# Patient Record
Sex: FEMALE | Race: WHITE | NOT HISPANIC OR LATINO | URBAN - METROPOLITAN AREA
[De-identification: names, ages, dates, MRNs, and addresses within clinical notes are randomized per-mention and may not be internally consistent; named-entity substitution may affect disease eponyms.]

---

## 2018-06-08 ENCOUNTER — EMERGENCY (EMERGENCY)
Facility: HOSPITAL | Age: 53
LOS: 1 days | Discharge: HOME | End: 2018-06-08
Admitting: EMERGENCY MEDICINE
Payer: COMMERCIAL

## 2018-06-08 VITALS
OXYGEN SATURATION: 100 % | SYSTOLIC BLOOD PRESSURE: 155 MMHG | DIASTOLIC BLOOD PRESSURE: 99 MMHG | TEMPERATURE: 98 F | RESPIRATION RATE: 20 BRPM | HEART RATE: 94 BPM

## 2018-06-08 DIAGNOSIS — Z98.89 OTHER SPECIFIED POSTPROCEDURAL STATES: Chronic | ICD-10-CM

## 2018-06-08 DIAGNOSIS — Z98.890 OTHER SPECIFIED POSTPROCEDURAL STATES: Chronic | ICD-10-CM

## 2018-06-08 PROCEDURE — 99283 EMERGENCY DEPT VISIT LOW MDM: CPT

## 2018-06-08 RX ORDER — PREDNISOLONE 5 MG
3 TABLET ORAL
Qty: 9 | Refills: 0 | OUTPATIENT
Start: 2018-06-08 | End: 2018-06-12

## 2018-06-08 RX ADMIN — Medication 40 MILLIGRAM(S): at 23:28

## 2018-06-08 NOTE — ED ADULT TRIAGE NOTE - CHIEF COMPLAINT QUOTE
C/o asthma exacerbation went to PCP this am told has URI, told the doctor she had prednisone at home but did not and by then office was closed. Respirations noted to be even and unlabored in triage, appears comfortable

## 2018-06-08 NOTE — ED PROVIDER NOTE - PROGRESS NOTE DETAILS
WOODY Soria: pt seen with WOODY Rawls-- 39 yo F here for URI sxs seen by pmd today and dx with viral syndrome, reports few days of cough congestion wheezing, hx of asthma and using albuterol prn with relief. denies fever chills cp sob. pmd told her to start prednisone taper today however patient thought she had at home but did not, reports typically does a taper from pred 40 mg. will dc with pred 40/30/20/10 x 4 days for taper. pt agrees with plan. lungs CTABL vss,.

## 2018-06-08 NOTE — ED PROVIDER NOTE - OBJECTIVE STATEMENT
41 yo F here for URI sxs seen by pmd today and dx with viral syndrome, reports few days of cough congestion wheezing, hx of asthma and using albuterol prn with relief. Pt was told by pmd to start prednisone taper however she does not have prescription at home. Has been using her nebulizer and albuterol pump. Denies sob, chest pain, wheezing. Speaks full sentences. No hospitalizations or intubations.

## 2018-06-08 NOTE — ED PROVIDER NOTE - CARE PLAN
Principal Discharge DX:	Asthma in adult without complication, unspecified asthma severity, unspecified whether persistent

## 2018-06-09 ENCOUNTER — EMERGENCY (EMERGENCY)
Facility: HOSPITAL | Age: 53
LOS: 1 days | Discharge: HOME | End: 2018-06-09
Attending: EMERGENCY MEDICINE | Admitting: EMERGENCY MEDICINE
Payer: COMMERCIAL

## 2018-06-09 VITALS
RESPIRATION RATE: 20 BRPM | DIASTOLIC BLOOD PRESSURE: 82 MMHG | TEMPERATURE: 98 F | OXYGEN SATURATION: 100 % | HEART RATE: 84 BPM | SYSTOLIC BLOOD PRESSURE: 142 MMHG

## 2018-06-09 DIAGNOSIS — Z98.890 OTHER SPECIFIED POSTPROCEDURAL STATES: Chronic | ICD-10-CM

## 2018-06-09 DIAGNOSIS — Z98.89 OTHER SPECIFIED POSTPROCEDURAL STATES: Chronic | ICD-10-CM

## 2018-06-09 PROCEDURE — 99285 EMERGENCY DEPT VISIT HI MDM: CPT | Mod: 25

## 2018-06-09 PROCEDURE — 93010 ELECTROCARDIOGRAM REPORT: CPT | Mod: NC

## 2018-06-09 NOTE — ED PROVIDER NOTE - MEDICAL DECISION MAKING DETAILS
Gypsy BROWN: 52 y/o female with hx of HTN, HLD, Asthma (never intubated or admitted for Asthma) here with SOB and chest discomfort. Patient reports 3 days of dyspnea associated with congestion in her back and also L sided chest discomfort. Also endorses sore throat. Patient saw her PMD who tested her for strep throat. Patient also was seen at [facility] last night and was prescribed Prednisone. Patient endorses worse dyspnea with exertion. Reports dry cough. Denies fever, chills, CP, palpitations, lightheaededness, syncope, neck pain, travel, sick contacts. or LE edema. Exam shows a female in NAD with clear lungs and abd soft and nontender and cardiac S1S2 noted. No rash and no LE edema. Consider Asthma, RAD, Bronchitis. Also given PMH consider ACS. Plan CBC, CMP, CXR, EKG, Coags, Cardiacs, Reassess.

## 2018-06-09 NOTE — ED PROVIDER NOTE - OBJECTIVE STATEMENT
Gypsy BROWN: 52 y/o female with hx of HTN, HLD, Asthma (never intubated or admitted for Asthma) here with SOB and chest discomfort. Patient reports 3 days of dyspnea associated with congestion in her back and also L sided chest discomfort. Also endorses sore throat. Patient saw her PMD who tested her for strep throat. Patient also was seen at [facility] last night and was prescribed Prednisone. Patient endorses worse dyspnea with exertion. Reports dry cough. Denies fever, chills, CP, palpitations, lightheaededness, syncope, neck pain, travel, sick contacts. or LE edema. Exam shows a female in NAD with clear lungs and abd soft and nontender and cardiac S1S2 noted. No rash and no LE edema. Consider Asthma, RAD, Bronchitis. Also given PMH consider ACS. Plan CBC, CMP, CXR, EKG, Coags, Cardiacs, Reassess.

## 2018-06-09 NOTE — ED PROVIDER NOTE - PROGRESS NOTE DETAILS
Gypsy BROWN: Patient reassessed and reports feeling better. Patient results discussed and she will follow up with her PMD

## 2018-06-09 NOTE — ED ADULT NURSE NOTE - OBJECTIVE STATEMENT
54 yo female bought by EMS complaining of "burning on my left lung when I take a deep breath" as per patient she is being in and out of hospitals and they cannot find what she has. Pt states being anxious, unable to sleep,  at the bedside. Pt alerted and oriented x3, no signs of acute distress noted at this moment, vitals WNL, lungs clear, heart sounds normal. Pt pending on MD evaluation, will continue to monitor closely.

## 2018-06-10 VITALS
DIASTOLIC BLOOD PRESSURE: 65 MMHG | OXYGEN SATURATION: 99 % | SYSTOLIC BLOOD PRESSURE: 108 MMHG | HEART RATE: 83 BPM | RESPIRATION RATE: 20 BRPM | TEMPERATURE: 98 F

## 2018-06-10 LAB
ALBUMIN SERPL ELPH-MCNC: 4.5 G/DL — SIGNIFICANT CHANGE UP (ref 3.3–5)
ALP SERPL-CCNC: 100 U/L — SIGNIFICANT CHANGE UP (ref 40–120)
ALT FLD-CCNC: 29 U/L — SIGNIFICANT CHANGE UP (ref 10–45)
ANION GAP SERPL CALC-SCNC: 16 MMOL/L — SIGNIFICANT CHANGE UP (ref 5–17)
APTT BLD: 27.5 SEC — SIGNIFICANT CHANGE UP (ref 27.5–37.4)
AST SERPL-CCNC: 21 U/L — SIGNIFICANT CHANGE UP (ref 10–40)
BASOPHILS # BLD AUTO: 0 K/UL — SIGNIFICANT CHANGE UP (ref 0–0.2)
BASOPHILS NFR BLD AUTO: 0.2 % — SIGNIFICANT CHANGE UP (ref 0–2)
BILIRUB SERPL-MCNC: 0.3 MG/DL — SIGNIFICANT CHANGE UP (ref 0.2–1.2)
BUN SERPL-MCNC: 10 MG/DL — SIGNIFICANT CHANGE UP (ref 7–23)
CALCIUM SERPL-MCNC: 9.6 MG/DL — SIGNIFICANT CHANGE UP (ref 8.4–10.5)
CHLORIDE SERPL-SCNC: 98 MMOL/L — SIGNIFICANT CHANGE UP (ref 96–108)
CK MB BLD-MCNC: 2.3 % — SIGNIFICANT CHANGE UP (ref 0–3.5)
CK MB CFR SERPL CALC: 3.9 NG/ML — HIGH (ref 0–3.8)
CK SERPL-CCNC: 169 U/L — SIGNIFICANT CHANGE UP (ref 25–170)
CO2 SERPL-SCNC: 21 MMOL/L — LOW (ref 22–31)
CREAT SERPL-MCNC: 0.61 MG/DL — SIGNIFICANT CHANGE UP (ref 0.5–1.3)
EOSINOPHIL # BLD AUTO: 0 K/UL — SIGNIFICANT CHANGE UP (ref 0–0.5)
EOSINOPHIL NFR BLD AUTO: 0.2 % — SIGNIFICANT CHANGE UP (ref 0–6)
GLUCOSE SERPL-MCNC: 143 MG/DL — HIGH (ref 70–99)
HCT VFR BLD CALC: 41.9 % — SIGNIFICANT CHANGE UP (ref 34.5–45)
HGB BLD-MCNC: 13.8 G/DL — SIGNIFICANT CHANGE UP (ref 11.5–15.5)
INR BLD: 1.07 RATIO — SIGNIFICANT CHANGE UP (ref 0.88–1.16)
LYMPHOCYTES # BLD AUTO: 0.7 K/UL — LOW (ref 1–3.3)
LYMPHOCYTES # BLD AUTO: 7.8 % — LOW (ref 13–44)
MCHC RBC-ENTMCNC: 27.5 PG — SIGNIFICANT CHANGE UP (ref 27–34)
MCHC RBC-ENTMCNC: 32.8 GM/DL — SIGNIFICANT CHANGE UP (ref 32–36)
MCV RBC AUTO: 83.8 FL — SIGNIFICANT CHANGE UP (ref 80–100)
MONOCYTES # BLD AUTO: 0.6 K/UL — SIGNIFICANT CHANGE UP (ref 0–0.9)
MONOCYTES NFR BLD AUTO: 6.9 % — SIGNIFICANT CHANGE UP (ref 2–14)
NEUTROPHILS # BLD AUTO: 7.6 K/UL — HIGH (ref 1.8–7.4)
NEUTROPHILS NFR BLD AUTO: 84.9 % — HIGH (ref 43–77)
PLATELET # BLD AUTO: 323 K/UL — SIGNIFICANT CHANGE UP (ref 150–400)
POTASSIUM SERPL-MCNC: 4.3 MMOL/L — SIGNIFICANT CHANGE UP (ref 3.5–5.3)
POTASSIUM SERPL-SCNC: 4.3 MMOL/L — SIGNIFICANT CHANGE UP (ref 3.5–5.3)
PROT SERPL-MCNC: 7.9 G/DL — SIGNIFICANT CHANGE UP (ref 6–8.3)
PROTHROM AB SERPL-ACNC: 11.6 SEC — SIGNIFICANT CHANGE UP (ref 9.8–12.7)
RBC # BLD: 5 M/UL — SIGNIFICANT CHANGE UP (ref 3.8–5.2)
RBC # FLD: 11.5 % — SIGNIFICANT CHANGE UP (ref 10.3–14.5)
SODIUM SERPL-SCNC: 135 MMOL/L — SIGNIFICANT CHANGE UP (ref 135–145)
TROPONIN T SERPL-MCNC: <0.01 NG/ML — SIGNIFICANT CHANGE UP (ref 0–0.06)
TROPONIN T SERPL-MCNC: <0.01 NG/ML — SIGNIFICANT CHANGE UP (ref 0–0.06)
WBC # BLD: 9 K/UL — SIGNIFICANT CHANGE UP (ref 3.8–10.5)
WBC # FLD AUTO: 9 K/UL — SIGNIFICANT CHANGE UP (ref 3.8–10.5)

## 2018-06-10 PROCEDURE — 99283 EMERGENCY DEPT VISIT LOW MDM: CPT | Mod: 25

## 2018-06-10 PROCEDURE — 82550 ASSAY OF CK (CPK): CPT

## 2018-06-10 PROCEDURE — 84484 ASSAY OF TROPONIN QUANT: CPT

## 2018-06-10 PROCEDURE — 85610 PROTHROMBIN TIME: CPT

## 2018-06-10 PROCEDURE — 85730 THROMBOPLASTIN TIME PARTIAL: CPT

## 2018-06-10 PROCEDURE — 80053 COMPREHEN METABOLIC PANEL: CPT

## 2018-06-10 PROCEDURE — 93005 ELECTROCARDIOGRAM TRACING: CPT

## 2018-06-10 PROCEDURE — 71046 X-RAY EXAM CHEST 2 VIEWS: CPT | Mod: 26

## 2018-06-10 PROCEDURE — 71046 X-RAY EXAM CHEST 2 VIEWS: CPT

## 2018-06-10 PROCEDURE — 82553 CREATINE MB FRACTION: CPT

## 2018-06-10 PROCEDURE — 85027 COMPLETE CBC AUTOMATED: CPT

## 2018-06-10 RX ORDER — ACETAMINOPHEN 500 MG
650 TABLET ORAL ONCE
Qty: 0 | Refills: 0 | Status: COMPLETED | OUTPATIENT
Start: 2018-06-10 | End: 2018-06-10

## 2018-06-10 RX ADMIN — Medication 650 MILLIGRAM(S): at 00:53

## 2018-06-13 ENCOUNTER — EMERGENCY (EMERGENCY)
Facility: HOSPITAL | Age: 53
LOS: 1 days | Discharge: HOME | End: 2018-06-13
Attending: EMERGENCY MEDICINE
Payer: COMMERCIAL

## 2018-06-13 VITALS
DIASTOLIC BLOOD PRESSURE: 98 MMHG | TEMPERATURE: 98 F | WEIGHT: 130.07 LBS | HEIGHT: 60 IN | SYSTOLIC BLOOD PRESSURE: 173 MMHG | OXYGEN SATURATION: 98 % | RESPIRATION RATE: 20 BRPM | HEART RATE: 88 BPM

## 2018-06-13 VITALS
SYSTOLIC BLOOD PRESSURE: 137 MMHG | HEART RATE: 94 BPM | OXYGEN SATURATION: 96 % | DIASTOLIC BLOOD PRESSURE: 73 MMHG | RESPIRATION RATE: 17 BRPM | TEMPERATURE: 98 F

## 2018-06-13 DIAGNOSIS — Z98.890 OTHER SPECIFIED POSTPROCEDURAL STATES: Chronic | ICD-10-CM

## 2018-06-13 DIAGNOSIS — Z98.89 OTHER SPECIFIED POSTPROCEDURAL STATES: Chronic | ICD-10-CM

## 2018-06-13 PROCEDURE — 99284 EMERGENCY DEPT VISIT MOD MDM: CPT

## 2018-06-13 PROCEDURE — 94640 AIRWAY INHALATION TREATMENT: CPT

## 2018-06-13 PROCEDURE — 99285 EMERGENCY DEPT VISIT HI MDM: CPT | Mod: 25

## 2018-06-13 RX ORDER — MONTELUKAST 4 MG/1
1 TABLET, CHEWABLE ORAL
Qty: 30 | Refills: 0 | OUTPATIENT
Start: 2018-06-13

## 2018-06-13 RX ORDER — IPRATROPIUM/ALBUTEROL SULFATE 18-103MCG
3 AEROSOL WITH ADAPTER (GRAM) INHALATION ONCE
Qty: 0 | Refills: 0 | Status: COMPLETED | OUTPATIENT
Start: 2018-06-13 | End: 2018-06-13

## 2018-06-13 RX ORDER — MONTELUKAST 4 MG/1
10 TABLET, CHEWABLE ORAL ONCE
Qty: 0 | Refills: 0 | Status: COMPLETED | OUTPATIENT
Start: 2018-06-13 | End: 2018-06-13

## 2018-06-13 RX ADMIN — Medication 3 MILLILITER(S): at 10:50

## 2018-06-13 RX ADMIN — Medication 40 MILLIGRAM(S): at 12:22

## 2018-06-13 RX ADMIN — Medication 3 MILLILITER(S): at 10:36

## 2018-06-13 RX ADMIN — MONTELUKAST 10 MILLIGRAM(S): 4 TABLET, CHEWABLE ORAL at 12:22

## 2018-06-13 RX ADMIN — Medication 3 MILLILITER(S): at 10:19

## 2018-06-13 NOTE — ED PROVIDER NOTE - ATTENDING CONTRIBUTION TO CARE
ATTENDING MD: I, Wilfredo Manuel, have personally seen and examined this patient.  I have discussed all aspects of care with the fellow. Fellow note reviewed and agree on plan of care and except where noted.  See HPI, PE, and MDM for details.    well appearing female in NAD, heart normal rate, regular rhythm, mucus membranes are moist, oropharynx is mildly erythemetous with cobblestoning, no exudate or edema, lungs with prolonged expiratory phase and expiratory wheezing. repeat exam after nebs is clear and without focal aventitious breath sounds. respirations before treatment with, no accessory muscle use or respriatory distress, normal rate, regular rhythm, no murmurs, rubs, or gallops, abd soft, notnenger, skin warma nd dry, no extremity swelling, asymmetry or tenderness, no JVD.    MDM: asthma exacerbation without major distress but persistent symptoms despite a treatmetn with low dose steroids. will give nebs, and higher dose/longer steroid burst, plan to f/u with PCP and pulmonology as outpatient. has already had negative XR and workup. I have advised the patient on the usual course of this condition, home care, an appropriate schedule for follow-up, and concerning signs and symptoms that should prompt return to the emergency department. I answered all questions to the best of my ability. The patient is stable for discharge.

## 2018-06-13 NOTE — ED PROVIDER NOTE - OBJECTIVE STATEMENT
52 yo f with history of HTN, HLD, asthma presents with chief complaint of shortness of breath for 4 days. Patient reports that she has had wheezing, coughing and nasal congestion. Seen previously at [facility] and [facility], given duonebs and prednisone with mild resolution of symptoms. She still had persistent symptoms and told her PMD who then put her a 5 day course of azithromycin. She is having persistent symptoms and so came to the ED for evaluation. 52 yo f with history of HTN, HLD, asthma presents with chief complaint of shortness of breath for 4 days. Patient reports that she has had wheezing, coughing and nasal congestion. Seen previously at [facility] and [facility], given duonebs and low dose prednisone with mild resolution of symptoms. She still had persistent symptoms and told her PMD who then put her a 5 day course of azithromycin. She is having persistent symptoms and so came to the ED for evaluation.

## 2018-06-13 NOTE — ED PROVIDER NOTE - PROGRESS NOTE DETAILS
feels improved, no major work of breathing. will plan DC with close f/u on prolonged hgiher dose steroid treatment

## 2018-06-13 NOTE — ED ADULT NURSE NOTE - OBJECTIVE STATEMENT
54 yo female with PMH asthma, HTN, osteoporosis, fibromyalgia presents to ED stating "I am having an asthma attack." Patient reports that she has been experiencing wheezes/cough/SOB x1 week, she was seen here in ED three days ago and DC home, patient followed up with her PMD who prescribed her azithromycin, patient on 3rd day of antibiotic treatment and notes that symptoms have gotten worse. She reports difficulty sleeping 2/2 cough, chest pain with coughing, and productive cough. She denies fever

## 2018-06-15 ENCOUNTER — INPATIENT (INPATIENT)
Facility: HOSPITAL | Age: 53
LOS: 4 days | Discharge: HOME | DRG: 202 | End: 2018-06-20
Attending: INTERNAL MEDICINE | Admitting: INTERNAL MEDICINE
Payer: COMMERCIAL

## 2018-06-15 VITALS
TEMPERATURE: 98 F | SYSTOLIC BLOOD PRESSURE: 148 MMHG | HEIGHT: 60 IN | WEIGHT: 130.07 LBS | OXYGEN SATURATION: 98 % | HEART RATE: 84 BPM | RESPIRATION RATE: 19 BRPM | DIASTOLIC BLOOD PRESSURE: 87 MMHG

## 2018-06-15 DIAGNOSIS — Z98.890 OTHER SPECIFIED POSTPROCEDURAL STATES: Chronic | ICD-10-CM

## 2018-06-15 DIAGNOSIS — Z98.89 OTHER SPECIFIED POSTPROCEDURAL STATES: Chronic | ICD-10-CM

## 2018-06-15 DIAGNOSIS — J45.901 UNSPECIFIED ASTHMA WITH (ACUTE) EXACERBATION: ICD-10-CM

## 2018-06-15 LAB
ALBUMIN SERPL ELPH-MCNC: 4.4 G/DL — SIGNIFICANT CHANGE UP (ref 3.3–5)
ALP SERPL-CCNC: 85 U/L — SIGNIFICANT CHANGE UP (ref 40–120)
ALT FLD-CCNC: 20 U/L — SIGNIFICANT CHANGE UP (ref 10–45)
ANION GAP SERPL CALC-SCNC: 17 MMOL/L — SIGNIFICANT CHANGE UP (ref 5–17)
AST SERPL-CCNC: 19 U/L — SIGNIFICANT CHANGE UP (ref 10–40)
BASOPHILS # BLD AUTO: 0 K/UL — SIGNIFICANT CHANGE UP (ref 0–0.2)
BASOPHILS NFR BLD AUTO: 0.4 % — SIGNIFICANT CHANGE UP (ref 0–2)
BILIRUB SERPL-MCNC: 0.3 MG/DL — SIGNIFICANT CHANGE UP (ref 0.2–1.2)
BUN SERPL-MCNC: 8 MG/DL — SIGNIFICANT CHANGE UP (ref 7–23)
CALCIUM SERPL-MCNC: 9.3 MG/DL — SIGNIFICANT CHANGE UP (ref 8.4–10.5)
CHLORIDE SERPL-SCNC: 96 MMOL/L — SIGNIFICANT CHANGE UP (ref 96–108)
CO2 SERPL-SCNC: 21 MMOL/L — LOW (ref 22–31)
CREAT SERPL-MCNC: 0.65 MG/DL — SIGNIFICANT CHANGE UP (ref 0.5–1.3)
EOSINOPHIL # BLD AUTO: 0 K/UL — SIGNIFICANT CHANGE UP (ref 0–0.5)
EOSINOPHIL NFR BLD AUTO: 0.2 % — SIGNIFICANT CHANGE UP (ref 0–6)
GAS PNL BLDV: SIGNIFICANT CHANGE UP
GLUCOSE SERPL-MCNC: 137 MG/DL — HIGH (ref 70–99)
HCT VFR BLD CALC: 43 % — SIGNIFICANT CHANGE UP (ref 34.5–45)
HGB BLD-MCNC: 14.3 G/DL — SIGNIFICANT CHANGE UP (ref 11.5–15.5)
HPIV3 RNA SPEC QL NAA+PROBE: DETECTED
LYMPHOCYTES # BLD AUTO: 1.4 K/UL — SIGNIFICANT CHANGE UP (ref 1–3.3)
LYMPHOCYTES # BLD AUTO: 11.3 % — LOW (ref 13–44)
MCHC RBC-ENTMCNC: 27.9 PG — SIGNIFICANT CHANGE UP (ref 27–34)
MCHC RBC-ENTMCNC: 33.1 GM/DL — SIGNIFICANT CHANGE UP (ref 32–36)
MCV RBC AUTO: 84.3 FL — SIGNIFICANT CHANGE UP (ref 80–100)
MONOCYTES # BLD AUTO: 0.4 K/UL — SIGNIFICANT CHANGE UP (ref 0–0.9)
MONOCYTES NFR BLD AUTO: 3.2 % — SIGNIFICANT CHANGE UP (ref 2–14)
NEUTROPHILS # BLD AUTO: 10.3 K/UL — HIGH (ref 1.8–7.4)
NEUTROPHILS NFR BLD AUTO: 85 % — HIGH (ref 43–77)
PLATELET # BLD AUTO: 399 K/UL — SIGNIFICANT CHANGE UP (ref 150–400)
POTASSIUM SERPL-MCNC: 4.3 MMOL/L — SIGNIFICANT CHANGE UP (ref 3.5–5.3)
POTASSIUM SERPL-SCNC: 4.3 MMOL/L — SIGNIFICANT CHANGE UP (ref 3.5–5.3)
PROT SERPL-MCNC: 8.1 G/DL — SIGNIFICANT CHANGE UP (ref 6–8.3)
RAPID RVP RESULT: DETECTED
RBC # BLD: 5.11 M/UL — SIGNIFICANT CHANGE UP (ref 3.8–5.2)
RBC # FLD: 11.7 % — SIGNIFICANT CHANGE UP (ref 10.3–14.5)
SODIUM SERPL-SCNC: 134 MMOL/L — LOW (ref 135–145)
WBC # BLD: 12.1 K/UL — HIGH (ref 3.8–10.5)
WBC # FLD AUTO: 12.1 K/UL — HIGH (ref 3.8–10.5)

## 2018-06-15 PROCEDURE — 71045 X-RAY EXAM CHEST 1 VIEW: CPT | Mod: 26

## 2018-06-15 PROCEDURE — 99285 EMERGENCY DEPT VISIT HI MDM: CPT

## 2018-06-15 RX ORDER — IPRATROPIUM/ALBUTEROL SULFATE 18-103MCG
3 AEROSOL WITH ADAPTER (GRAM) INHALATION EVERY 6 HOURS
Qty: 0 | Refills: 0 | Status: DISCONTINUED | OUTPATIENT
Start: 2018-06-15 | End: 2018-06-18

## 2018-06-15 RX ORDER — MOMETASONE FUROATE 220 UG/1
2 INHALANT RESPIRATORY (INHALATION)
Qty: 0 | Refills: 0 | COMMUNITY

## 2018-06-15 RX ORDER — BUDESONIDE, MICRONIZED 100 %
0.5 POWDER (GRAM) MISCELLANEOUS
Qty: 0 | Refills: 0 | Status: DISCONTINUED | OUTPATIENT
Start: 2018-06-15 | End: 2018-06-20

## 2018-06-15 RX ORDER — CHOLECALCIFEROL (VITAMIN D3) 125 MCG
1 CAPSULE ORAL
Qty: 0 | Refills: 0 | COMMUNITY

## 2018-06-15 RX ORDER — LEVALBUTEROL 1.25 MG/.5ML
0 SOLUTION, CONCENTRATE RESPIRATORY (INHALATION)
Qty: 0 | Refills: 0 | COMMUNITY

## 2018-06-15 RX ORDER — IPRATROPIUM/ALBUTEROL SULFATE 18-103MCG
3 AEROSOL WITH ADAPTER (GRAM) INHALATION EVERY 6 HOURS
Qty: 0 | Refills: 0 | Status: DISCONTINUED | OUTPATIENT
Start: 2018-06-15 | End: 2018-06-15

## 2018-06-15 RX ORDER — ACETAMINOPHEN 500 MG
1 TABLET ORAL
Qty: 0 | Refills: 0 | COMMUNITY

## 2018-06-15 RX ORDER — ACETAMINOPHEN 500 MG
650 TABLET ORAL EVERY 6 HOURS
Qty: 0 | Refills: 0 | Status: DISCONTINUED | OUTPATIENT
Start: 2018-06-15 | End: 2018-06-20

## 2018-06-15 RX ORDER — HYDROCORTISONE 20 MG
40 TABLET ORAL EVERY 8 HOURS
Qty: 0 | Refills: 0 | Status: DISCONTINUED | OUTPATIENT
Start: 2018-06-15 | End: 2018-06-15

## 2018-06-15 RX ORDER — IPRATROPIUM/ALBUTEROL SULFATE 18-103MCG
3 AEROSOL WITH ADAPTER (GRAM) INHALATION ONCE
Qty: 0 | Refills: 0 | Status: COMPLETED | OUTPATIENT
Start: 2018-06-15 | End: 2018-06-15

## 2018-06-15 RX ORDER — MONTELUKAST 4 MG/1
10 TABLET, CHEWABLE ORAL DAILY
Qty: 0 | Refills: 0 | Status: DISCONTINUED | OUTPATIENT
Start: 2018-06-15 | End: 2018-06-20

## 2018-06-15 RX ORDER — VALSARTAN 80 MG/1
1.5 TABLET ORAL
Qty: 0 | Refills: 0 | COMMUNITY

## 2018-06-15 RX ORDER — FLUTICASONE PROPIONATE 50 MCG
1 SPRAY, SUSPENSION NASAL
Qty: 0 | Refills: 0 | COMMUNITY

## 2018-06-15 RX ORDER — MOMETASONE FUROATE 220 UG/1
0 INHALANT RESPIRATORY (INHALATION)
Qty: 0 | Refills: 0 | COMMUNITY

## 2018-06-15 RX ORDER — VALSARTAN 80 MG/1
160 TABLET ORAL DAILY
Qty: 0 | Refills: 0 | Status: DISCONTINUED | OUTPATIENT
Start: 2018-06-15 | End: 2018-06-20

## 2018-06-15 RX ORDER — SODIUM CHLORIDE 9 MG/ML
1000 INJECTION INTRAMUSCULAR; INTRAVENOUS; SUBCUTANEOUS ONCE
Qty: 0 | Refills: 0 | Status: COMPLETED | OUTPATIENT
Start: 2018-06-15 | End: 2018-06-15

## 2018-06-15 RX ADMIN — Medication 650 MILLIGRAM(S): at 22:00

## 2018-06-15 RX ADMIN — SODIUM CHLORIDE 500 MILLILITER(S): 9 INJECTION INTRAMUSCULAR; INTRAVENOUS; SUBCUTANEOUS at 17:42

## 2018-06-15 RX ADMIN — Medication 80 MILLIGRAM(S): at 16:03

## 2018-06-15 RX ADMIN — Medication 3 MILLILITER(S): at 23:04

## 2018-06-15 RX ADMIN — Medication 650 MILLIGRAM(S): at 21:12

## 2018-06-15 RX ADMIN — Medication 3 MILLILITER(S): at 14:55

## 2018-06-15 NOTE — H&P ADULT - FAMILY HISTORY
Family history of heart attack     Sibling  Still living? Unknown  Family history of systemic lupus erythematosus, Age at diagnosis: Age Unknown  Family history of cancer, Age at diagnosis: Age Unknown

## 2018-06-15 NOTE — H&P ADULT - NSHPLABSRESULTS_GEN_ALL_CORE
LABS:                        14.3   12.1  )-----------( 399      ( 15 Jackson 2018 14:55 )             43.0     06-15    134<L>  |  96  |  8   ----------------------------<  137<H>  4.3   |  21<L>  |  0.65    Ca    9.3      15 Jackson 2018 14:55    TPro  8.1  /  Alb  4.4  /  TBili  0.3  /  DBili  x   /  AST  19  /  ALT  20  /  AlkPhos  85  06-15              RADIOLOGY & ADDITIONAL TESTS:

## 2018-06-15 NOTE — ED PROVIDER NOTE - ATTENDING CONTRIBUTION TO CARE
52 y/o f with pmhx HTN HLD, asthma (never intubated) presents for third visit to ED in 1 week for continued shortness of breath and asthma exacerbation. patient was seen earlier today in MD office and still with SOB, sent to ED. no fever. cough non productive. no abd pain. no chills. no recent travel. no abck pain. no diarrhea. no urinary complaints. Patient started on steroids last Friday at [facility] 40 mgs daily and was switched to 60 mg 4 days ago. Was seen at [facility] yesterday.   Gen.  no acute distress  HEENT:  PERRL EOMI   Lungs:  b/l rhonchi and wheezes, no retractions. o2 sat 98%on room air  CVS: S1S2   Abd;  soft non tender  Ext: no edema  Neuro: aaox3 no focal deficits  MSK: 5/5 x 4 ext

## 2018-06-15 NOTE — ED PROVIDER NOTE - FAMILY HISTORY
Mother  Still living? Unknown  Family history of heart attack, Age at diagnosis: Age Unknown     Sibling  Still living? Unknown  Family history of systemic lupus erythematosus, Age at diagnosis: Age Unknown  Family history of cancer, Age at diagnosis: Age Unknown

## 2018-06-15 NOTE — ED ADULT TRIAGE NOTE - CHIEF COMPLAINT QUOTE
SOB, wheezing for 1 week  pt on prednisone for 6 days, home nebulizer and singulair- pt states that she is still having break though wheezing

## 2018-06-15 NOTE — ED PROVIDER NOTE - OBJECTIVE STATEMENT
52 y/o F hx of HTN, HLD, asthma presents with wheezing today. Pt recently was in ED on June 13, and June 09, respectively for asthma exacerbation. Pt was given duoenbs, and sent on PO steroids. Pt has been taking prednisone 60 mg and using albuterol nebulizer every 4 hours. Pt however has had wheezing. Pt also completed a 5 day course of azithromycin today. Pt went to PCP today, and had a full blown asthma attack. Pt was given duoneb treatment and sent to the emergency room. Pt had a cold last week. Denies fevers, chills, CP, abdominal pain, n/v/d. Endorses dry cough.

## 2018-06-15 NOTE — ED ADULT NURSE NOTE - OBJECTIVE STATEMENT
53 y f came to the ed with an asthma attack. patient has no evidence of acute resp distress but states her md just gave her two nebulizer treatments in her office prior to sending her to the ed. states she was seen here 2 times within the last week and has nebulizer treatments and prednisone and inhaled steroids with minimal relief. patient is a/ox3. denies any fevers, chills, chest pain, sob. skin is warm and dry.

## 2018-06-15 NOTE — ED PROVIDER NOTE - MEDICAL DECISION MAKING DETAILS
Will obtain CBC, CMP, venous blood gas, CXR, dounebs. Will obtain CBC, CMP, venous blood gas, CXR, dounebs.  ATTG: : short of breath likely secondary to asthma exa, will increase steroid, freq of neb and check repeat xray

## 2018-06-15 NOTE — H&P ADULT - HISTORY OF PRESENT ILLNESS
54 y/o F hx of HTN, HLD, asthma presents with wheezing today. Pt recently was in ED on June 13, and June 09, respectively for asthma exacerbation. Pt was given duoenbs, and sent on PO steroids. Pt has been taking prednisone 60 mg and using albuterol nebulizer every 4 hours. Pt however has had wheezing. Pt also completed a 5 day course of azithromycin today. Pt went to PCP today, and had a full blown asthma attack. Pt was given duoneb treatment and sent to the emergency room. Pt had a cold last week. Denies fevers, chills, CP, abdominal pain, n/v/d. Endorses dry cough.

## 2018-06-15 NOTE — H&P ADULT - ASSESSMENT
52 y/o F hx of HTN, HLD, asthma presents with wheezing today. Pt recently was in ED on June 13, and June 09, respectively for asthma exacerbation. Pt was given duoenbs, and sent on PO steroids. Pt has been taking prednisone 60 mg and using albuterol nebulizer every 4 hours. Pt however has had wheezing. Pt also completed a 5 day course of azithromycin today. Pt went to PCP today, and had a full blown asthma attack. Pt was given duoneb treatment and sent to the emergency room. Pt had a cold last week. Denies fevers, chills, CP, abdominal pain, n/v/d. Endorses dry cough.    parainfluenza induced asthma exacerbation  - solumedrol 40 iv q 8  - duonebs q 6 atc  - Pulmicort bid  - Robitussin prn  - cw singulair    htn  - cw home meds

## 2018-06-16 LAB
ANION GAP SERPL CALC-SCNC: 14 MMOL/L — SIGNIFICANT CHANGE UP (ref 5–17)
BASOPHILS # BLD AUTO: 0.01 K/UL — SIGNIFICANT CHANGE UP (ref 0–0.2)
BASOPHILS NFR BLD AUTO: 0.1 % — SIGNIFICANT CHANGE UP (ref 0–2)
BUN SERPL-MCNC: 10 MG/DL — SIGNIFICANT CHANGE UP (ref 7–23)
CALCIUM SERPL-MCNC: 9.4 MG/DL — SIGNIFICANT CHANGE UP (ref 8.4–10.5)
CHLORIDE SERPL-SCNC: 102 MMOL/L — SIGNIFICANT CHANGE UP (ref 96–108)
CO2 SERPL-SCNC: 24 MMOL/L — SIGNIFICANT CHANGE UP (ref 22–31)
CREAT SERPL-MCNC: 0.7 MG/DL — SIGNIFICANT CHANGE UP (ref 0.5–1.3)
EOSINOPHIL # BLD AUTO: 0 K/UL — SIGNIFICANT CHANGE UP (ref 0–0.5)
EOSINOPHIL NFR BLD AUTO: 0 % — SIGNIFICANT CHANGE UP (ref 0–6)
GLUCOSE SERPL-MCNC: 113 MG/DL — HIGH (ref 70–99)
HCT VFR BLD CALC: 40.5 % — SIGNIFICANT CHANGE UP (ref 34.5–45)
HGB BLD-MCNC: 13.3 G/DL — SIGNIFICANT CHANGE UP (ref 11.5–15.5)
IMM GRANULOCYTES NFR BLD AUTO: 1.5 % — SIGNIFICANT CHANGE UP (ref 0–1.5)
LYMPHOCYTES # BLD AUTO: 1.98 K/UL — SIGNIFICANT CHANGE UP (ref 1–3.3)
LYMPHOCYTES # BLD AUTO: 15.3 % — SIGNIFICANT CHANGE UP (ref 13–44)
MAGNESIUM SERPL-MCNC: 2.4 MG/DL — SIGNIFICANT CHANGE UP (ref 1.6–2.6)
MCHC RBC-ENTMCNC: 27.5 PG — SIGNIFICANT CHANGE UP (ref 27–34)
MCHC RBC-ENTMCNC: 32.8 GM/DL — SIGNIFICANT CHANGE UP (ref 32–36)
MCV RBC AUTO: 83.9 FL — SIGNIFICANT CHANGE UP (ref 80–100)
MONOCYTES # BLD AUTO: 1.13 K/UL — HIGH (ref 0–0.9)
MONOCYTES NFR BLD AUTO: 8.8 % — SIGNIFICANT CHANGE UP (ref 2–14)
NEUTROPHILS # BLD AUTO: 9.6 K/UL — HIGH (ref 1.8–7.4)
NEUTROPHILS NFR BLD AUTO: 74.3 % — SIGNIFICANT CHANGE UP (ref 43–77)
PHOSPHATE SERPL-MCNC: 4.4 MG/DL — SIGNIFICANT CHANGE UP (ref 2.5–4.5)
PLATELET # BLD AUTO: 398 K/UL — SIGNIFICANT CHANGE UP (ref 150–400)
POTASSIUM SERPL-MCNC: 4.3 MMOL/L — SIGNIFICANT CHANGE UP (ref 3.5–5.3)
POTASSIUM SERPL-SCNC: 4.3 MMOL/L — SIGNIFICANT CHANGE UP (ref 3.5–5.3)
RBC # BLD: 4.83 M/UL — SIGNIFICANT CHANGE UP (ref 3.8–5.2)
RBC # FLD: 13.7 % — SIGNIFICANT CHANGE UP (ref 10.3–14.5)
SODIUM SERPL-SCNC: 140 MMOL/L — SIGNIFICANT CHANGE UP (ref 135–145)
TSH SERPL-MCNC: 0.58 UIU/ML — SIGNIFICANT CHANGE UP (ref 0.27–4.2)
VIT B12 SERPL-MCNC: 946 PG/ML — SIGNIFICANT CHANGE UP (ref 232–1245)
WBC # BLD: 12.91 K/UL — HIGH (ref 3.8–10.5)
WBC # FLD AUTO: 12.91 K/UL — HIGH (ref 3.8–10.5)

## 2018-06-16 RX ORDER — VALSARTAN 80 MG/1
80 TABLET ORAL DAILY
Qty: 0 | Refills: 0 | Status: DISCONTINUED | OUTPATIENT
Start: 2018-06-16 | End: 2018-06-20

## 2018-06-16 RX ADMIN — Medication 650 MILLIGRAM(S): at 09:00

## 2018-06-16 RX ADMIN — Medication 3 MILLILITER(S): at 23:32

## 2018-06-16 RX ADMIN — Medication 40 MILLIGRAM(S): at 22:42

## 2018-06-16 RX ADMIN — VALSARTAN 80 MILLIGRAM(S): 80 TABLET ORAL at 06:11

## 2018-06-16 RX ADMIN — Medication 0.5 MILLIGRAM(S): at 05:30

## 2018-06-16 RX ADMIN — Medication 40 MILLIGRAM(S): at 05:30

## 2018-06-16 RX ADMIN — Medication 3 MILLILITER(S): at 05:30

## 2018-06-16 RX ADMIN — Medication 650 MILLIGRAM(S): at 08:29

## 2018-06-16 RX ADMIN — Medication 40 MILLIGRAM(S): at 14:07

## 2018-06-16 RX ADMIN — MONTELUKAST 10 MILLIGRAM(S): 4 TABLET, CHEWABLE ORAL at 11:49

## 2018-06-16 RX ADMIN — Medication 3 MILLILITER(S): at 11:49

## 2018-06-16 RX ADMIN — Medication 3 MILLILITER(S): at 17:16

## 2018-06-16 RX ADMIN — Medication 650 MILLIGRAM(S): at 16:35

## 2018-06-16 RX ADMIN — Medication 0.5 MILLIGRAM(S): at 17:16

## 2018-06-16 RX ADMIN — Medication 650 MILLIGRAM(S): at 16:04

## 2018-06-16 RX ADMIN — VALSARTAN 160 MILLIGRAM(S): 80 TABLET ORAL at 06:11

## 2018-06-16 NOTE — CONSULT NOTE ADULT - SUBJECTIVE AND OBJECTIVE BOX
[facility] - [facility]  CONSULT NOTE    CHIEF COMPLAINT: 53 yr old woman, history of asthma, sent to the hospital from PMD office with asthma exacerbation.  The patient has had increased cough, sob for approximately one week. She was seen in the ED twice and discharged with prednisone, bronchodilators and azithromycin.  While at her PMD office yesterday, she developed acute onset of wheeze, sob, inability to breathe.  She feel better at present , but still has chest tightness and cough.  No fever at present, small amount of clear mucus.  no palpitations or dizziness.    HPI:    PMHX:  Neuropathy  Osteoarthritis  HLD (hyperlipidemia)  HTN (hypertension)  Asthma in adult      PSHX:  H/O laparoscopy  H/O hernia repair  H/O myomectomy  No significant past surgical history      FAMILY HISTORY:  Family history of cancer (Sibling): jaw cancer  Family history of systemic lupus erythematosus (Sibling)  Family history of heart attack      SOCIAL HISTORY:    Pulmonary Medications:   ALBUTerol/ipratropium for Nebulization 3 milliLiter(s) Nebulizer every 6 hours  buDESOnide   0.5 milliGRAM(s) Respule 0.5 milliGRAM(s) Inhalation two times a day  guaiFENesin  milliGRAM(s) Oral every 12 hours  montelukast 10 milliGRAM(s) Oral daily      Antimicrobials:      Cardiology:  valsartan 160 milliGRAM(s) Oral daily  valsartan 80 milliGRAM(s) Oral daily      Other:  acetaminophen   Tablet. 650 milliGRAM(s) Oral every 6 hours PRN  methylPREDNISolone sodium succinate Injectable 40 milliGRAM(s) IV Push every 8 hours      Allergies    No Known Allergies    Intolerances        HOME MEDICATIONS:    REVIEW OF SYSTEMS: (Negative unless otherwise delineated)     Constitutional: No fevers, chills, sweats. weight loss, fatigue, weakness, malaise, lethargy  Eyes: No itching or discharge from the eyes, visual change, blurred vision, double vision, yellow sclerae, eye pain.  ENT: No ear pain, ear discharge, tinnitus, change in hearing, nasal congestion, runny nose, post nasal drip, epistaxis, sinus pain, sore throat, globus sensation, dental problems, oral ulcers/lesions  CV: No chest pain, chest pressure, chest discomfort, palpitations, lightheadedness/dizziness, syncope, lower extremity edema, inability to lay flat to sleep, awakening from sleep unable to sleep, claudication.  Resp: ++dyspnea at rest, dyspnea on exertion, chest congestion/wheeze, cough. GI: No anorexia, nausea, vomiting, constipation, abdominal pain. blood in the stool, diarrhea, melena, change in bowel habits or stools, dysphagia, odynophagia, heartburn  : No burning on urination, urinary urgency, urinary frequency, urinary hesitancy, incontinence, blood in the urine, waking up at night to urinate, change in urine color, urinary retention.   Neurological: No headache, dizziness, syncope, paralysis, unsteady gait, muscle weakness, change in bowel or bladder control, numbness or tingling in the extremities, change in smell or taste, change in speech, tremor, memory change/loss, vertigo, frequent falls, confusion  MSK: No muscle pain, back pain, joint pain, joint stiffness, joint swelling   Hematologic: No anemia, bleeding, bruising, ecchymoses.   Lymphatics: No enlarged nodes, history of splenectomy  Psychiatric: No depression, anxiety, bipolar disorde, schizophrenia, hallucinations, suicidal/homicidal thoughts  Endocrinologic: No weight change, sweating, cold or heat intolerance, polyuria, polydipsia, polyphagia, abnormal hair growth, change in nails, tremor, neck pain or swelling.   Allergies: No hives, eczema, allergic rhinitis  Integumentary: No rash, new/growing/changing skin lesions, bruising, pruritis, decubiti                                                                                                                                                                                [ ]Unable to obtain    OBJECTIVE:      ICU Vital Signs Last 24 Hrs  T(C): 36.7 (16 Jun 2018 10:50), Max: 36.9 (15 Jackson 2018 19:30)  T(F): 98.1 (16 Jun 2018 10:50), Max: 98.4 (15 Jackson 2018 19:30)  HR: 81 (16 Jun 2018 10:50) (62 - 90)  BP: 129/85 (16 Jun 2018 10:50) (115/71 - 143/87)  BP(mean): --  ABP: --  ABP(mean): --  RR: 20 (16 Jun 2018 10:50) (18 - 20)  SpO2: 98% (16 Jun 2018 10:50) (97% - 99%)      I&O's Detail    15 Jackson 2018 07:01  -  16 Jun 2018 07:00  --------------------------------------------------------  IN:    Oral Fluid: 1040 mL  Total IN: 1040 mL    OUT:  Total OUT: 0 mL    Total NET: 1040 mL      16 Jun 2018 07:01  -  16 Jun 2018 15:38  --------------------------------------------------------  IN:    Oral Fluid: 360 mL  Total IN: 360 mL    OUT:  Total OUT: 0 mL    Total NET: 360 mL        Daily Height in cm: 152.4 (15 Jackson 2018 19:30)    Daily   CAPILLARY BLOOD GLUCOSE          PHYSICAL EXAM:  General: Awake, alert, cooperative, no distress, appears stated age   Head: Atraumatic, normocephalic  Eyes: Anicteric, conjunctiva/corneas clear, EOM's intact, PERRL, both eyes               Ears: External examination WNL, both ears                Nose: Nares normal, septum midline, mucosa normal, no drainage or sinus tenderness  Throat: Mallampatti Grade     No tonsillar or pharyngeal exudates. Lips, mucosa and tongue WNL; teeth and gums WNL  Neck: Supple, symmetrica, trachea midline; thyroid without enlargement/tenderness/nodules; no carotid bruit; no JVD  Back: Symmetric, no curvature, range of motion normal, no CVA tenderness  Chest wall: No tenderness or deformity  Respiratory: Respirations unlabored; scattered wheeze  Cardiovascular: Regular rate and rhythm, S1 S2 normal. No murmurs, rubs or gallops.   Abdomen: Soft, non-tender, non-distended. No organomegaly. No masses. Normal bowel sounds  Extremities: Warm to touch. No clubbing or cyanosis. No pedal edema.  Pulses: 2+ peripheral pulses all extremities  Skin: Normal skin color, texture and turgor. No rashes or lesions  Lymph Nodes: Cervical, supraclavicular and axillary nodes normal  Neurological: Motor and sensory examination equal and normal. A and O x 3  Psychiatry: Appropriate mood and affect.    LABS:                        13.3   12.91 )-----------( 398      ( 16 Jun 2018 09:20 )             40.5     06-16    140  |  102  |  10  ----------------------------<  113<H>  4.3   |  24  |  0.70    Ca    9.4      16 Jun 2018 06:31  Phos  4.4     06-16  Mg     2.4     06-16    TPro  8.1  /  Alb  4.4  /  TBili  0.3  /  DBili  x   /  AST  19  /  ALT  20  /  AlkPhos  85  06-15        Venous Blood Gas:  06-15 @ 14:55  7.39/40/38/24/71  VBG Lactate: 3.3        MICROBIOLOGY:     RADIOLOGY:  [ ] Reviewed and interpreted by me    < from: Xray Chest 1 View- PORTABLE-Urgent (06.15.18 @ 17:30) >  PROCEDURE DATE:  06/15/2018            INTERPRETATION:  CLINICAL INFORMATION: Shortness of breath    TECHNIQUE: AP view of the chest.    COMPARISON: Chest x-ray 6/10/2018    FINDINGS:     The lungs are clear. No pneumothorax.  The cardiomediastinal silhouette is unremarkable.  The visualized osseous structures are unremarkable for age.    IMPRESSION:   Clear lungs.          < end of copied text >

## 2018-06-16 NOTE — PROGRESS NOTE ADULT - SUBJECTIVE AND OBJECTIVE BOX
Patient is a 53y old  Female who presents with a chief complaint of wheezing (15 Jackson 2018 23:21)      SUBJECTIVE / OVERNIGHT EVENTS:  feels better.  can not bring up phlem    MEDICATIONS  (STANDING):  ALBUTerol/ipratropium for Nebulization 3 milliLiter(s) Nebulizer every 6 hours  buDESOnide   0.5 milliGRAM(s) Respule 0.5 milliGRAM(s) Inhalation two times a day  methylPREDNISolone sodium succinate Injectable 40 milliGRAM(s) IV Push every 8 hours  montelukast 10 milliGRAM(s) Oral daily  valsartan 160 milliGRAM(s) Oral daily  valsartan 80 milliGRAM(s) Oral daily    MEDICATIONS  (PRN):  acetaminophen   Tablet. 650 milliGRAM(s) Oral every 6 hours PRN Moderate Pain (4 - 6)      Vital Signs Last 24 Hrs  T(C): 36.6 (16 Jun 2018 05:24), Max: 36.9 (15 Jackson 2018 19:30)  T(F): 97.8 (16 Jun 2018 05:24), Max: 98.4 (15 Jackson 2018 19:30)  HR: 62 (16 Jun 2018 05:24) (62 - 90)  BP: 143/87 (16 Jun 2018 05:24) (115/71 - 148/87)  BP(mean): --  RR: 20 (16 Jun 2018 05:24) (18 - 20)  SpO2: 97% (16 Jun 2018 05:24) (97% - 99%)  CAPILLARY BLOOD GLUCOSE        I&O's Summary    15 Jackson 2018 07:01  -  16 Jun 2018 07:00  --------------------------------------------------------  IN: 1040 mL / OUT: 0 mL / NET: 1040 mL        PHYSICAL EXAM:  GENERAL: NAD, well-developed  HEAD:  Atraumatic, Normocephalic  EYES: EOMI, PERRLA, conjunctiva and sclera clear  NECK: Supple, No JVD  CHEST/LUNG: eve wheezing has improved  HEART: Regular rate and rhythm; No murmurs, rubs, or gallops  ABDOMEN: Soft, Nontender, Nondistended; Bowel sounds present  EXTREMITIES:  2+ Peripheral Pulses, No clubbing, cyanosis, or edema  PSYCH: AAOx3  NEUROLOGY: non-focal  SKIN: No rashes or lesions    LABS:                        14.3   12.1  )-----------( 399      ( 15 Jackson 2018 14:55 )             43.0     06-16    140  |  102  |  10  ----------------------------<  113<H>  4.3   |  24  |  0.70    Ca    9.4      16 Jun 2018 06:31  Phos  4.4     06-16  Mg     2.4     06-16    TPro  8.1  /  Alb  4.4  /  TBili  0.3  /  DBili  x   /  AST  19  /  ALT  20  /  AlkPhos  85  06-15              RADIOLOGY & ADDITIONAL TESTS:    Imaging Personally Reviewed:    Consultant(s) Notes Reviewed:      Care Discussed with Consultants/Other Providers:

## 2018-06-16 NOTE — CONSULT NOTE ADULT - ASSESSMENT
CHIEF COMPLAINT: 53 yr old woman, history of asthma, sent to the hospital from PMD office with asthma exacerbation.  The patient has had increased cough, sob for approximately one week. She was seen in the ED twice and discharged with prednisone, bronchodilators and azithromycin.  While at her PMD office yesterday, she developed acute onset of wheeze, sob, inability to breathe.  She feel better at present , but still has chest tightness and cough;---Parainfluenza induced asthma exacerbation    continue bronchodilators qid  continue budesonide bid  solumedrol 40 q8- pt did not improve with outpt oral steroids  pulmonary hygiene  dvt prophylaxis  routine gi prophylaxis as necessary  Oxygen saturation greater than 92%    I have explained the situation at great length to the patient    Merry Dunbar MD  [phone]  Pulmonary

## 2018-06-17 PROCEDURE — 93010 ELECTROCARDIOGRAM REPORT: CPT

## 2018-06-17 RX ORDER — ENOXAPARIN SODIUM 100 MG/ML
40 INJECTION SUBCUTANEOUS EVERY 24 HOURS
Qty: 0 | Refills: 0 | Status: DISCONTINUED | OUTPATIENT
Start: 2018-06-17 | End: 2018-06-20

## 2018-06-17 RX ADMIN — Medication 20 MILLIGRAM(S): at 23:03

## 2018-06-17 RX ADMIN — Medication 0.5 MILLIGRAM(S): at 06:38

## 2018-06-17 RX ADMIN — Medication 40 MILLIGRAM(S): at 05:53

## 2018-06-17 RX ADMIN — Medication 3 MILLILITER(S): at 17:04

## 2018-06-17 RX ADMIN — Medication 650 MILLIGRAM(S): at 20:28

## 2018-06-17 RX ADMIN — VALSARTAN 80 MILLIGRAM(S): 80 TABLET ORAL at 05:55

## 2018-06-17 RX ADMIN — MONTELUKAST 10 MILLIGRAM(S): 4 TABLET, CHEWABLE ORAL at 11:11

## 2018-06-17 RX ADMIN — Medication 0.5 MILLIGRAM(S): at 17:03

## 2018-06-17 RX ADMIN — Medication 20 MILLIGRAM(S): at 13:25

## 2018-06-17 RX ADMIN — Medication 3 MILLILITER(S): at 11:11

## 2018-06-17 RX ADMIN — Medication 3 MILLILITER(S): at 05:55

## 2018-06-17 RX ADMIN — Medication 650 MILLIGRAM(S): at 20:59

## 2018-06-17 RX ADMIN — VALSARTAN 160 MILLIGRAM(S): 80 TABLET ORAL at 05:55

## 2018-06-17 NOTE — CHART NOTE - NSCHARTNOTEFT_GEN_A_CORE
MEDICINE PA     Event Summary: Notified by RN that patient complaining of anxiety. Pt assessed at bedside by me. States that shortly after taking after inhaler          Vital Signs Last 24 Hrs  T(C): 36.3 (17 Jun 2018 20:15), Max: 36.7 (17 Jun 2018 04:43)  T(F): 97.3 (17 Jun 2018 20:15), Max: 98.1 (17 Jun 2018 04:43)  HR: 104 (17 Jun 2018 20:15) (68 - 104)  BP: 162/88 (17 Jun 2018 20:15) (111/68 - 162/88)  BP(mean): --  RR: 20 (17 Jun 2018 20:15) (18 - 20)  SpO2: 99% (17 Jun 2018 20:15) (96% - 99%)    Physical Assessment:  General: Awake, No acute distress.   Neurology: A&Ox3, nonfocal  CV: +S1S2, RRR.  No peripheral edema  Respiratory: Even, unlabored.  CTA B/L.    Abdomen:  +BS.  Soft, NT, ND.  No palpable mass  MSK: FINN x4.   Skin: Warm and Dry         A/P:                Sandy Hodges PA-C   [facility]IC[facility] PA     Event Summary: Notified by RN that patient complaining of "anxiety". Pt assessed at bedside by me. States that shortly after taking after inhaler began to feel anxious, with increased pressure in sinuses throughout the day. At present patient states that anxiety is coming down. Has taken benzodiazepines in the past, however is very sensitive to it. Endorses mild gas in stomach. Denies CP, SOB, nausea, emesis, changes in vision, trouble speaking, diaphoresis, pain radiating to arm/back/jaw.     Vital Signs Last 24 Hrs  T(C): 36.3 (17 Jun 2018 20:15), Max: 36.7 (17 Jun 2018 04:43)  T(F): 97.3 (17 Jun 2018 20:15), Max: 98.1 (17 Jun 2018 04:43)  HR: 80 regular, palpated at bedside by me   BP: 162/88 (17 Jun 2018 20:15) (111/68 - 162/88)  RR: 20 (17 Jun 2018 20:15) (18 - 20)  SpO2: 99% (17 Jun 2018 20:15) (96% - 99%)    Physical Assessment:  General: Awake, No acute distress.   Neurology: A&Ox3, ROSS. EOMI. +eyebrow raise. Smile symmetric no facial droop. Tongue midline no fasciculations. Facial sensation intact to light touch b/l. 5/5 strength b/l UE. Sensation intact to light touch b/l UE.   CV: +S1S2, RRR.  No peripheral edema  Respiratory: Even, unlabored.  + L sided wheeze on posterior ausculation   Abdomen:  +BS.  Soft, NT, ND.  No palpable mass  MSK: BRISENO x4.   Skin: Warm and Dry     A/P:  HPI: 54 y/o F hx of HTN, HLD, asthma presents admitted with wheezing.  Being treated for                 HOMER Souza MEDICINE PA     Event Summary: Notified by RN that patient complaining of "anxiety". Pt assessed at bedside by me. States that shortly after taking after inhaler began to feel anxious, with increased pressure in sinuses throughout the day. At present patient states that anxiety is coming down. Has taken benzodiazepines in the past, however is very sensitive to it. Endorses mild gas in stomach. Denies CP, SOB, nausea, emesis, changes in vision, trouble speaking, diaphoresis, pain radiating to arm/back/jaw.     Vital Signs Last 24 Hrs  T(C): 36.3 (17 Jun 2018 20:15), Max: 36.7 (17 Jun 2018 04:43)  T(F): 97.3 (17 Jun 2018 20:15), Max: 98.1 (17 Jun 2018 04:43)  HR: 80 regular, palpated at bedside by me   BP: 162/88 (17 Jun 2018 20:15) (111/68 - 162/88)  RR: 20 (17 Jun 2018 20:15) (18 - 20)  SpO2: 99% (17 Jun 2018 20:15) (96% - 99%)    Physical Assessment:  General: Awake, No acute distress.   Neurology: A&Ox3, ROSS. EOMI. +eyebrow raise. Smile symmetric no facial droop. Tongue midline no fasciculations. Facial sensation intact to light touch b/l. 5/5 strength b/l UE. Sensation intact to light touch b/l UE.   CV: +S1S2, RRR.  No peripheral edema  Respiratory: Even, unlabored.  + L sided wheeze on posterior ausculation   Abdomen:  +BS.  Soft, NT, ND.  No palpable mass  MSK: BRISENO x4.   Skin: Warm and Dry     A/P:  HPI: 52 y/o F hx of HTN, HLD, asthma presents admitted with wheezing.  Being treated for asthma exacerbation 2/2 parainfluenza. Acutely presenting with tachycardia,htn, anxiety.     1. Tachycardia/HTN likely 2/2 anxiety vs. IV steroids vs. albuterol   - ECG showing NSR in 80s. Pulse palpated at bedside 80 regular   - Repeat manual /80 ?2/2 to IV steroids. Dose decreased today                Sandy Hodges PA-C   [facility]     Event Summary: Notified by RN that patient complaining of "anxiety". Pt assessed at bedside by me. States that shortly after taking after inhaler began to feel anxious, with increased pressure in sinuses throughout the day. At present patient states that anxiety is coming down. Has taken benzodiazepines in the past, however is very sensitive to it. Endorses mild gas in stomach. Denies CP, SOB, nausea, emesis, changes in vision, trouble speaking, diaphoresis, pain radiating to arm/back/jaw.     Vital Signs Last 24 Hrs  T(C): 36.3 (17 Jun 2018 20:15), Max: 36.7 (17 Jun 2018 04:43)  T(F): 97.3 (17 Jun 2018 20:15), Max: 98.1 (17 Jun 2018 04:43)  HR: 80 regular, palpated at bedside by me   BP: 162/88 (17 Jun 2018 20:15) (111/68 - 162/88)  RR: 20 (17 Jun 2018 20:15) (18 - 20)  SpO2: 99% (17 Jun 2018 20:15) (96% - 99%)    Physical Assessment:  General: Awake, No acute distress.   Neurology: A&Ox3, ROSS. EOMI. +eyebrow raise. Smile symmetric no facial droop. Tongue midline no fasciculations. Facial sensation intact to light touch b/l. 5/5 strength b/l UE. Sensation intact to light touch b/l UE.   CV: +S1S2, RRR.  No peripheral edema  Respiratory: Even, unlabored.  + L sided wheeze on posterior ausculation   Abdomen:  +BS.  Soft, NT, ND.  No palpable mass  MSK: BRISENO x4.   Skin: Warm and Dry     A/P:  HPI: 52 y/o F hx of HTN, HLD, asthma presents admitted with wheezing.  Being treated for asthma exacerbation 2/2 parainfluenza. Acutely presenting with tachycardia,htn, anxiety.     1. Tachycardia/HTN likely 2/2 anxiety vs. IV steroids vs. albuterol   - ECG showing NSR in 80s. Pulse palpated at bedside 80 regular   - Repeat manual /80 ?2/2 to IV steroids. Dose decreased today- consider switching to PO taper   - Xoponex instead of Duonebs x1   - Continue close monitoring of clinical status/vital signs  - Will endorse to primary team in AM    Sandy Hodges PA-C    Department of Medicine   [facility] [id number] MEDICINE PA     Event Summary: Notified by RN that patient complaining of "anxiety". Pt assessed at bedside by me. States that shortly after taking after inhaler began to feel anxious, with increased pressure in sinuses throughout the day. At present patient states that anxiety is coming down. Has taken benzodiazepines in the past, however is very sensitive to it. Endorses mild gas in stomach. Denies CP, SOB, nausea, emesis, changes in vision, trouble speaking, diaphoresis, pain radiating to arm/back/jaw.     Vital Signs Last 24 Hrs  T(C): 36.3 (17 Jun 2018 20:15), Max: 36.7 (17 Jun 2018 04:43)  T(F): 97.3 (17 Jun 2018 20:15), Max: 98.1 (17 Jun 2018 04:43)  HR: 80 regular, palpated at bedside by me   BP: 162/88 (17 Jun 2018 20:15) (111/68 - 162/88)  RR: 20 (17 Jun 2018 20:15) (18 - 20)  SpO2: 99% (17 Jun 2018 20:15) (96% - 99%)    Physical Assessment:  General: Awake, No acute distress.   Neurology: A&Ox3, ROSS. EOMI. +eyebrow raise. Smile symmetric no facial droop. Tongue midline no fasciculations. Facial sensation intact to light touch b/l. 5/5 strength b/l UE. Sensation intact to light touch b/l UE.   CV: +S1S2, RRR.  No peripheral edema  Respiratory: Even, unlabored.  + L sided wheeze on posterior ausculation   Abdomen:  +BS.  Soft, NT, ND.  No palpable mass  MSK: BRISENO x4.   Skin: Warm and Dry     A/P:  HPI: 54 y/o F hx of HTN, HLD, asthma presents admitted with wheezing.  Being treated for asthma exacerbation 2/2 parainfluenza. Acutely presenting with tachycardia,htn, anxiety.     1. Tachycardia/HTN likely 2/2 anxiety vs. IV steroids vs. albuterol   - ECG showing NSR in 80s. Pulse palpated at bedside 80 regular   - Repeat manual /80 ?2/2 to IV steroids. Dose decreased today- consider switching to PO taper pending improvement in respiratory status  - Xoponex instead of Duonebs x1   - Continue close monitoring of clinical status/vital signs  - Will endorse to primary team in AM    Sandy Hodges PA-C    Department of Medicine   [facility] [id number]

## 2018-06-17 NOTE — PROVIDER CONTACT NOTE (OTHER) - ASSESSMENT
Pt vitals 162/88 manual BP  temp 97.3, RR 20, spo2 99% Pulse 104- 116  PT states she is uneasy, feels discomfort in frontal facial sinuses

## 2018-06-17 NOTE — PROGRESS NOTE ADULT - SUBJECTIVE AND OBJECTIVE BOX
Follow-up Pulm Progress Note    No new respiratory events overnight.  Denies increased SOB, chest pain, cough or mucus. slightly better this am    Medications:  MEDICATIONS  (STANDING):  ALBUTerol/ipratropium for Nebulization 3 milliLiter(s) Nebulizer every 6 hours  buDESOnide   0.5 milliGRAM(s) Respule 0.5 milliGRAM(s) Inhalation two times a day  methylPREDNISolone sodium succinate Injectable 40 milliGRAM(s) IV Push every 8 hours  montelukast 10 milliGRAM(s) Oral daily  valsartan 160 milliGRAM(s) Oral daily  valsartan 80 milliGRAM(s) Oral daily    MEDICATIONS  (PRN):  acetaminophen   Tablet. 650 milliGRAM(s) Oral every 6 hours PRN Moderate Pain (4 - 6)  guaiFENesin    Syrup 200 milliGRAM(s) Oral every 6 hours PRN Cough      Vent settings (if applicable)      Vital Signs Last 24 Hrs  T(C): 36.7 (17 Jun 2018 04:43), Max: 36.7 (17 Jun 2018 04:43)  T(F): 98.1 (17 Jun 2018 04:43), Max: 98.1 (17 Jun 2018 04:43)  HR: 69 (17 Jun 2018 04:43) (68 - 74)  BP: 112/71 (17 Jun 2018 04:43) (111/68 - 135/87)  BP(mean): --  RR: 18 (17 Jun 2018 04:43) (18 - 20)  SpO2: 96% (17 Jun 2018 04:43) (96% - 98%)          06-16 @ 07:01  -  06-17 @ 07:00  --------------------------------------------------------  IN: 1360 mL / OUT: 0 mL / NET: 1360 mL          LABS:                        13.3   12.91 )-----------( 398      ( 16 Jun 2018 09:20 )             40.5     06-16    140  |  102  |  10  ----------------------------<  113<H>  4.3   |  24  |  0.70    Ca    9.4      16 Jun 2018 06:31  Phos  4.4     06-16  Mg     2.4     06-16    TPro  8.1  /  Alb  4.4  /  TBili  0.3  /  DBili  x   /  AST  19  /  ALT  20  /  AlkPhos  85  06-15          CAPILLARY BLOOD GLUCOSE                  CULTURES:        Physical Examination:  Awake and alert, generally comfortable  HEENT: unremarkable  PULM: scattered wheeze to auscultation bilaterally, no significant sputum production  CVS: Regular rate and rhythm, no murmurs, rubs, or gallops  Abd:  soft, non tender  Extrem: No CCE    RADIOLOGY REVIEWED  CXR:    CT chest:

## 2018-06-17 NOTE — PROGRESS NOTE ADULT - SUBJECTIVE AND OBJECTIVE BOX
Patient is a 53y old  Female who presents with a chief complaint of wheezing (15 Jackson 2018 23:21)      SUBJECTIVE / OVERNIGHT EVENTS:  No chest pain. No shortness of breath. No complaints. No events overnight.     MEDICATIONS  (STANDING):  ALBUTerol/ipratropium for Nebulization 3 milliLiter(s) Nebulizer every 6 hours  buDESOnide   0.5 milliGRAM(s) Respule 0.5 milliGRAM(s) Inhalation two times a day  enoxaparin Injectable 40 milliGRAM(s) SubCutaneous every 24 hours  methylPREDNISolone sodium succinate Injectable 20 milliGRAM(s) IV Push every 8 hours  montelukast 10 milliGRAM(s) Oral daily  valsartan 160 milliGRAM(s) Oral daily  valsartan 80 milliGRAM(s) Oral daily    MEDICATIONS  (PRN):  acetaminophen   Tablet. 650 milliGRAM(s) Oral every 6 hours PRN Moderate Pain (4 - 6)  guaiFENesin    Syrup 200 milliGRAM(s) Oral every 6 hours PRN Cough      Vital Signs Last 24 Hrs  T(C): 36.7 (17 Jun 2018 04:43), Max: 36.7 (17 Jun 2018 04:43)  T(F): 98.1 (17 Jun 2018 04:43), Max: 98.1 (17 Jun 2018 04:43)  HR: 69 (17 Jun 2018 04:43) (68 - 74)  BP: 112/71 (17 Jun 2018 04:43) (111/68 - 135/87)  BP(mean): --  RR: 18 (17 Jun 2018 04:43) (18 - 20)  SpO2: 96% (17 Jun 2018 04:43) (96% - 98%)  CAPILLARY BLOOD GLUCOSE        I&O's Summary    16 Jun 2018 07:01  -  17 Jun 2018 07:00  --------------------------------------------------------  IN: 1360 mL / OUT: 0 mL / NET: 1360 mL    17 Jun 2018 07:01  -  17 Jun 2018 11:49  --------------------------------------------------------  IN: 240 mL / OUT: 0 mL / NET: 240 mL        PHYSICAL EXAM:  GENERAL: NAD, well-developed  HEAD:  Atraumatic, Normocephalic  EYES: EOMI, PERRLA, conjunctiva and sclera clear  NECK: Supple, No JVD  CHEST/LUNG: Clear to auscultation bilaterally; minimal wheeze  HEART: Regular rate and rhythm; No murmurs, rubs, or gallops  ABDOMEN: Soft, Nontender, Nondistended; Bowel sounds present  EXTREMITIES:  2+ Peripheral Pulses, No clubbing, cyanosis, or edema  PSYCH: AAOx3  NEUROLOGY: non-focal  SKIN: No rashes or lesions    LABS:                        13.3   12.91 )-----------( 398      ( 16 Jun 2018 09:20 )             40.5     06-16    140  |  102  |  10  ----------------------------<  113<H>  4.3   |  24  |  0.70    Ca    9.4      16 Jun 2018 06:31  Phos  4.4     06-16  Mg     2.4     06-16    TPro  8.1  /  Alb  4.4  /  TBili  0.3  /  DBili  x   /  AST  19  /  ALT  20  /  AlkPhos  85  06-15              RADIOLOGY & ADDITIONAL TESTS:    Imaging Personally Reviewed:    Consultant(s) Notes Reviewed:      Care Discussed with Consultants/Other Providers:

## 2018-06-18 ENCOUNTER — TRANSCRIPTION ENCOUNTER (OUTPATIENT)
Age: 53
End: 2018-06-18

## 2018-06-18 RX ORDER — IPRATROPIUM/ALBUTEROL SULFATE 18-103MCG
3 AEROSOL WITH ADAPTER (GRAM) INHALATION
Qty: 0 | Refills: 0 | Status: DISCONTINUED | OUTPATIENT
Start: 2018-06-18 | End: 2018-06-20

## 2018-06-18 RX ORDER — NYSTATIN 500MM UNIT
500000 POWDER (EA) MISCELLANEOUS
Qty: 0 | Refills: 0 | Status: DISCONTINUED | OUTPATIENT
Start: 2018-06-18 | End: 2018-06-20

## 2018-06-18 RX ORDER — AMLODIPINE BESYLATE 2.5 MG/1
2.5 TABLET ORAL ONCE
Qty: 0 | Refills: 0 | Status: COMPLETED | OUTPATIENT
Start: 2018-06-18 | End: 2018-06-18

## 2018-06-18 RX ORDER — MONTELUKAST 4 MG/1
1 TABLET, CHEWABLE ORAL
Qty: 0 | Refills: 0 | COMMUNITY
Start: 2018-06-18

## 2018-06-18 RX ORDER — LEVALBUTEROL 1.25 MG/.5ML
0.63 SOLUTION, CONCENTRATE RESPIRATORY (INHALATION) ONCE
Qty: 0 | Refills: 0 | Status: COMPLETED | OUTPATIENT
Start: 2018-06-18 | End: 2018-06-18

## 2018-06-18 RX ORDER — LANOLIN ALCOHOL/MO/W.PET/CERES
3 CREAM (GRAM) TOPICAL AT BEDTIME
Qty: 0 | Refills: 0 | Status: DISCONTINUED | OUTPATIENT
Start: 2018-06-18 | End: 2018-06-20

## 2018-06-18 RX ADMIN — Medication 20 MILLIGRAM(S): at 21:38

## 2018-06-18 RX ADMIN — Medication 650 MILLIGRAM(S): at 19:06

## 2018-06-18 RX ADMIN — Medication 650 MILLIGRAM(S): at 10:45

## 2018-06-18 RX ADMIN — Medication 200 MILLIGRAM(S): at 05:01

## 2018-06-18 RX ADMIN — Medication 3 MILLILITER(S): at 21:38

## 2018-06-18 RX ADMIN — LEVALBUTEROL 0.63 MILLIGRAM(S): 1.25 SOLUTION, CONCENTRATE RESPIRATORY (INHALATION) at 01:13

## 2018-06-18 RX ADMIN — Medication 650 MILLIGRAM(S): at 11:15

## 2018-06-18 RX ADMIN — Medication 3 MILLILITER(S): at 17:19

## 2018-06-18 RX ADMIN — MONTELUKAST 10 MILLIGRAM(S): 4 TABLET, CHEWABLE ORAL at 12:11

## 2018-06-18 RX ADMIN — Medication 20 MILLIGRAM(S): at 14:30

## 2018-06-18 RX ADMIN — Medication 3 MILLILITER(S): at 12:11

## 2018-06-18 RX ADMIN — Medication 20 MILLIGRAM(S): at 05:01

## 2018-06-18 RX ADMIN — Medication 500000 UNIT(S): at 17:19

## 2018-06-18 RX ADMIN — VALSARTAN 80 MILLIGRAM(S): 80 TABLET ORAL at 05:01

## 2018-06-18 RX ADMIN — Medication 3 MILLILITER(S): at 05:21

## 2018-06-18 RX ADMIN — AMLODIPINE BESYLATE 2.5 MILLIGRAM(S): 2.5 TABLET ORAL at 01:34

## 2018-06-18 RX ADMIN — VALSARTAN 160 MILLIGRAM(S): 80 TABLET ORAL at 05:01

## 2018-06-18 NOTE — DISCHARGE NOTE ADULT - PROVIDER TOKENS
FREE:[LAST:[Anaya],FIRST:[Emely],PHONE:[(   )    -],FAX:[(   )    -],ADDRESS:[Primary care doctor]] FREE:[LAST:[Héctor],FIRST:[Emely],PHONE:[(   )    -],FAX:[(   )    -],ADDRESS:[Primary care doctor]],TOKEN:'[email or web address]'

## 2018-06-18 NOTE — PROGRESS NOTE ADULT - SUBJECTIVE AND OBJECTIVE BOX
Patient is a 53y old  Female who presents with a chief complaint of wheezing (18 Jun 2018 11:06)      SUBJECTIVE / OVERNIGHT EVENTS: feels worse today.  can not sleep.  has difficulty swallowing    MEDICATIONS  (STANDING):  ALBUTerol/ipratropium for Nebulization 3 milliLiter(s) Nebulizer every 6 hours  buDESOnide   0.5 milliGRAM(s) Respule 0.5 milliGRAM(s) Inhalation two times a day  enoxaparin Injectable 40 milliGRAM(s) SubCutaneous every 24 hours  melatonin 3 milliGRAM(s) Oral at bedtime  methylPREDNISolone sodium succinate Injectable 20 milliGRAM(s) IV Push every 8 hours  montelukast 10 milliGRAM(s) Oral daily  valsartan 160 milliGRAM(s) Oral daily  valsartan 80 milliGRAM(s) Oral daily    MEDICATIONS  (PRN):  acetaminophen   Tablet. 650 milliGRAM(s) Oral every 6 hours PRN Moderate Pain (4 - 6)  guaiFENesin    Syrup 200 milliGRAM(s) Oral every 6 hours PRN Cough      Vital Signs Last 24 Hrs  T(C): 36.4 (18 Jun 2018 11:00), Max: 36.8 (18 Jun 2018 04:51)  T(F): 97.6 (18 Jun 2018 11:00), Max: 98.2 (18 Jun 2018 04:51)  HR: 85 (18 Jun 2018 11:00) (66 - 104)  BP: 149/91 (18 Jun 2018 11:00) (134/78 - 162/88)  BP(mean): --  RR: 18 (18 Jun 2018 11:00) (18 - 20)  SpO2: 98% (18 Jun 2018 11:00) (96% - 99%)  CAPILLARY BLOOD GLUCOSE        I&O's Summary    17 Jun 2018 07:01  -  18 Jun 2018 07:00  --------------------------------------------------------  IN: 2698 mL / OUT: 0 mL / NET: 2698 mL    18 Jun 2018 07:01  -  18 Jun 2018 14:25  --------------------------------------------------------  IN: 120 mL / OUT: 0 mL / NET: 120 mL        PHYSICAL EXAM:  GENERAL: NAD, well-developed  HEAD:  Atraumatic, Normocephalic  ,. pharynx with thrush  EYES: EOMI, PERRLA, conjunctiva and sclera clear  NECK: Supple, No JVD  CHEST/LUNG: eve wheezing and ronchi  HEART: Regular rate and rhythm; No murmurs, rubs, or gallops  ABDOMEN: Soft, Nontender, Nondistended; Bowel sounds present  EXTREMITIES:  2+ Peripheral Pulses, No clubbing, cyanosis, or edema  PSYCH: AAOx3  NEUROLOGY: non-focal  SKIN: No rashes or lesions    LABS:                    RADIOLOGY & ADDITIONAL TESTS:    Imaging Personally Reviewed:    Consultant(s) Notes Reviewed:      Care Discussed with Consultants/Other Providers:

## 2018-06-18 NOTE — PROVIDER CONTACT NOTE (MEDICATION) - ACTION/TREATMENT ORDERED:
Risk and benefits explain to patient, verbalized understanding. intermittent pneumatic compression order to follow.

## 2018-06-18 NOTE — PROVIDER CONTACT NOTE (MEDICATION) - ACTION/TREATMENT ORDERED:
Risk and benefits explained, verbalized understanding, no new orders at this time.  continue plan of care.

## 2018-06-18 NOTE — DISCHARGE NOTE ADULT - CARE PLAN
Principal Discharge DX:	Parainfluenza  Goal:	Free from reoccurrence of infection  Assessment and plan of treatment:	Supportive care  Secondary Diagnosis:	Asthma exacerbation  Assessment and plan of treatment:	Continue with Principal Discharge DX:	Parainfluenza  Goal:	Free from reoccurrence of infection  Assessment and plan of treatment:	Supportive care  Secondary Diagnosis:	Asthma exacerbation  Assessment and plan of treatment:	Continue with Prednisone taper Principal Discharge DX:	Parainfluenza  Goal:	Free from reoccurrence of infection  Assessment and plan of treatment:	Supportive care  Secondary Diagnosis:	Asthma exacerbation  Assessment and plan of treatment:	Continue with Prednisone taper  Continue with inhalers

## 2018-06-18 NOTE — PROGRESS NOTE ADULT - SUBJECTIVE AND OBJECTIVE BOX
[facility]     PROGRESS NOTE    CHIEF COMPLAINT: parainfluenza viral infection; asthma exacerbation    INTERVAL HISTORY: ongoing severe cough, chest congestion and wheeze; anxious with elevated blood pressure overnight; no fevers, chills or sweats; no chest pain/pressure or palpitations    REVIEW OF SYSTEMS:  Constitutional: As per interval history  HEENT: Within normal limits  CV: As per interval history  Resp: As per interval history  GI: Within normal limits   : Within normal limits  Musculoskeletal: Within normal limits  Skin: Within normal limits  Neurological: Within normal limits  Psychiatric: Within normal limits  Endocrine: Within normal limits  Hematologic/Lymphatic: Within normal limits  Allergic/Immunologic: Within normal limits    MEDICATIONS:     Pulmonary "  ALBUTerol/ipratropium for Nebulization 3 milliLiter(s) Nebulizer every 6 hours  buDESOnide   0.5 milliGRAM(s) Respule 0.5 milliGRAM(s) Inhalation two times a day  guaiFENesin    Syrup 200 milliGRAM(s) Oral every 6 hours PRN  montelukast 10 milliGRAM(s) Oral daily      Anti-microbials:  nystatin    Suspension 037118 Unit(s) Oral four times a day      Cardiovascular:  valsartan 160 milliGRAM(s) Oral daily  valsartan 80 milliGRAM(s) Oral daily      Other:  acetaminophen   Tablet. 650 milliGRAM(s) Oral every 6 hours PRN  enoxaparin Injectable 40 milliGRAM(s) SubCutaneous every 24 hours  melatonin 3 milliGRAM(s) Oral at bedtime  methylPREDNISolone sodium succinate Injectable 20 milliGRAM(s) IV Push every 8 hours        OBJECTIVE:    I&O's Detail    17 Jun 2018 07:01  -  18 Jun 2018 07:00  --------------------------------------------------------  IN:    Oral Fluid: 2698 mL  Total IN: 2698 mL    OUT:  Total OUT: 0 mL    Total NET: 2698 mL      18 Jun 2018 07:01  -  18 Jun 2018 18:31  --------------------------------------------------------  IN:    Oral Fluid: 320 mL  Total IN: 320 mL    OUT:  Total OUT: 0 mL    Total NET: 320 mL    PHYSICAL EXAM:       ICU Vital Signs Last 24 Hrs  T(C): 36.4 (18 Jun 2018 11:00), Max: 36.8 (18 Jun 2018 04:51)  T(F): 97.6 (18 Jun 2018 11:00), Max: 98.2 (18 Jun 2018 04:51)  HR: 85 (18 Jun 2018 11:00) (66 - 104)  BP: 149/91 (18 Jun 2018 11:00) (134/78 - 162/88)  BP(mean): --  ABP: --  ABP(mean): --  RR: 18 (18 Jun 2018 11:00) (18 - 20)  SpO2: 98% (18 Jun 2018 11:00) (96% - 99%) on room air     General: Awake. Alert. Cooperative. No distress. Appears stated age 	  HEENT:   AT/NC/Anicteric. PERRL/EOMI. Improved thrush  Neck: Supple. Trachea midline. Thyroid without enlargement/tenderness/nodules. No carotid bruit. No JVD	  Cardiovascular: Regular rate and rhythm. S1 S2 normal. No M/R/G  Respiratory: Respirations unlabored. Diffuse rhonchi and wheeze  Abdomen: Soft. Non-tender. Non-distended. No organomegaly. No masses. Normal BS  Extremities: Warm to touch. No CCE  Pulses: 2+ peripheral pulses all extremities	  Skin: Normal skin color. No rashes or lesions. No ecchymoses. No cyanosis.  Warm to touch  Lymph Nodes: Cervical, supraclavicular and axillary nodes normal  Neurological: Motor and sensory examination equal and normal. A and O x 3  Psychiatry: Appropriate mood and affect.      LABS:    06-16    140  |  102  |  10  ----------------------------<  113<H>  4.3   |  24  |  0.70    06-15    134<L>  |  96  |  8   ----------------------------<  137<H>  4.3   |  21<L>  |  0.65    Ca      9.4      06-16    Ca      9.3      06-15    Phos    4.4     06-16      Mg       2.4     06-16    TPro  8.1  /  Alb  4.4  /  TBili  0.3  /  DBili  x   /  AST  19  /  ALT  20  /  AlkPhos  85  06-15    MICROBIOLOGY:     Rapid Respiratory Viral Panel (06.15.18 @ 14:55)    Rapid RVP Result: Detected: The [employer] RVP Rapid uses polymerase chain reaction (PCR) and melt  curve analysis to screen for adenovirus; coronavirus HKU1, NL63, 229E,  OC43; human metapneumovirus (hMPV); human enterovirus/rhinovirus  (Entero/RV); influenza A; influenza A/H1;influenza A/H3; influenza  A/H1-2009; influenza B; parainfluenza viruses 1, 2, 3, 4; respiratory  syncytial virus; Bordetella pertussis; Mycoplasma pneumoniae; and  Chlamydophila pneumoniae.    Parainfluenza 3 (RapRVP): Detected    RADIOLOGY:  [x] Chest radiographs reviewed and interpreted by me    < from: Xray Chest 2 Views PA/Lat (06.10.18 @ 00:19) >    EXAM:  XR CHEST PA LAT 2V                            PROCEDURE DATE:  06/10/2018      INTERPRETATION:  CLINICAL INFORMATION: Shortness of breath and burning   chest pain.    TECHNIQUE: PA and lateral views of the chest on 6/10/2018 12:19 AM.    COMPARISON: None available    FINDINGS:     No focal consolidations, pleural effusions, or pneumothorax.  The heart is normal in size.  Degenerative changes of the thoracic spine.    IMPRESSION:   Clear lungs.  Normal heart size.    FEDERICO THACKER M.D., RADIOLOGY RESIDENT  This document has been electronically signed.  PALMA DONNELLY M.D., ATTENDING RADIOLOGIST  This document has been electronically signed. Jackson 10 2018  9:05AM      < end of copied text >  ---------------------------------------------------------------------------------------------------------  < from: Xray Chest 1 View- PORTABLE-Urgent (06.15.18 @ 17:30) >    EXAM:  XR CHEST PORTABLE URGENT 1V                          PROCEDURE DATE:  06/15/2018      INTERPRETATION:  CLINICAL INFORMATION: Shortness of breath    TECHNIQUE: AP view of the chest.    COMPARISON: Chest x-ray 6/10/2018    FINDINGS:     The lungs are clear. No pneumothorax.  The cardiomediastinal silhouette is unremarkable.  The visualized osseous structures are unremarkable for age.    IMPRESSION:   Clear lungs.    ROSELINE WALKER M.D., RADIOLOGY RESIDENT  This document has been electronically signed.  MANUEL MENENDZE M.D., ATTENDING RADIOLOGIST  This document has been electronically signed. Jun 16 2018  8:16AM     < end of copied text >  ---------------------------------------------------------------------------------------------------------

## 2018-06-18 NOTE — DISCHARGE NOTE ADULT - PATIENT PORTAL LINK FT
You can access the [facility] Patient Portal, offered by [facility], by registering with the following website: [email or web address]

## 2018-06-18 NOTE — DISCHARGE NOTE ADULT - ADDITIONAL INSTRUCTIONS
Follow up with your Primary care doctor Follow up with your Primary care doctor  Follow up with Pulmonary

## 2018-06-18 NOTE — DISCHARGE NOTE ADULT - HOSPITAL COURSE
Patient is a 53y old  Female who presents with a chief complaint of wheezing.  RVP+ for parainfluenza viral induced severe asthma exacerbation -slow improvement - steroid use has been complicated by thrush, hypertension and anxiety  now improved, no sob.   dc on prednisone taper, c/w inhaler and f/u w/ pulmonary

## 2018-06-18 NOTE — DISCHARGE NOTE ADULT - CARE PROVIDER_API CALL
Emely Anaya  Primary care doctor  Phone: (   )    -  Fax: (   )    - Emely Anaya  Primary care doctor  Phone: (   )    -  Fax: (   )    -    Jason Bagley (MD), Internal Medicine; Pulmonary Disease  [address] [place] [address]  Phone: [phone]  Fax: [phone]

## 2018-06-18 NOTE — DISCHARGE NOTE ADULT - MEDICATION SUMMARY - MEDICATIONS TO CHANGE
I will SWITCH the dose or number of times a day I take the medications listed below when I get home from the hospital:    albuterol 2.5 mg/3 mL (0.083%) inhalation solution  -- 3 milliliter(s) inhaled every 6 hours, As Needed

## 2018-06-18 NOTE — DISCHARGE NOTE ADULT - PLAN OF CARE
Free from reoccurrence of infection Supportive care Continue with Continue with Prednisone taper Continue with Prednisone taper  Continue with inhalers

## 2018-06-19 PROCEDURE — 71250 CT THORAX DX C-: CPT | Mod: 26

## 2018-06-19 RX ORDER — CEFUROXIME AXETIL 250 MG
500 TABLET ORAL EVERY 12 HOURS
Qty: 0 | Refills: 0 | Status: DISCONTINUED | OUTPATIENT
Start: 2018-06-19 | End: 2018-06-19

## 2018-06-19 RX ORDER — CEFUROXIME AXETIL 250 MG
500 TABLET ORAL EVERY 12 HOURS
Qty: 0 | Refills: 0 | Status: DISCONTINUED | OUTPATIENT
Start: 2018-06-19 | End: 2018-06-20

## 2018-06-19 RX ADMIN — Medication 20 MILLIGRAM(S): at 21:17

## 2018-06-19 RX ADMIN — Medication 3 MILLILITER(S): at 06:10

## 2018-06-19 RX ADMIN — VALSARTAN 80 MILLIGRAM(S): 80 TABLET ORAL at 06:10

## 2018-06-19 RX ADMIN — Medication 20 MILLIGRAM(S): at 06:09

## 2018-06-19 RX ADMIN — Medication 500000 UNIT(S): at 18:47

## 2018-06-19 RX ADMIN — Medication 500000 UNIT(S): at 00:21

## 2018-06-19 RX ADMIN — Medication 0.5 MILLIGRAM(S): at 18:47

## 2018-06-19 RX ADMIN — Medication 500 MILLIGRAM(S): at 18:45

## 2018-06-19 RX ADMIN — Medication 500000 UNIT(S): at 06:10

## 2018-06-19 RX ADMIN — Medication 3 MILLILITER(S): at 10:07

## 2018-06-19 RX ADMIN — Medication 500000 UNIT(S): at 14:08

## 2018-06-19 RX ADMIN — Medication 20 MILLIGRAM(S): at 14:07

## 2018-06-19 RX ADMIN — Medication 3 MILLILITER(S): at 18:47

## 2018-06-19 RX ADMIN — Medication 200 MILLIGRAM(S): at 14:09

## 2018-06-19 RX ADMIN — Medication 3 MILLIGRAM(S): at 21:16

## 2018-06-19 RX ADMIN — Medication 3 MILLILITER(S): at 14:07

## 2018-06-19 RX ADMIN — Medication 200 MILLIGRAM(S): at 21:16

## 2018-06-19 RX ADMIN — MONTELUKAST 10 MILLIGRAM(S): 4 TABLET, CHEWABLE ORAL at 14:08

## 2018-06-19 RX ADMIN — VALSARTAN 160 MILLIGRAM(S): 80 TABLET ORAL at 06:10

## 2018-06-19 NOTE — PROGRESS NOTE ADULT - SUBJECTIVE AND OBJECTIVE BOX
[facility]     PROGRESS NOTE    CHIEF COMPLAINT: parainfluenza viral infection; asthma exacerbation; cough    INTERVAL HISTORY:  some improvement in cough, chest congestion and wheeze with increased frequency of nebulized bronchodilators; slept well with hycodan; better BP control; anxious; no fevers, chills or sweats; no chest pain/pressure or palpitations;     REVIEW OF SYSTEMS:  Constitutional: As per interval history  HEENT: Within normal limits  CV: As per interval history  Resp: As per interval history  GI: Within normal limits   : Within normal limits  Musculoskeletal: Within normal limits  Skin: Within normal limits  Neurological: Within normal limits  Psychiatric: anxiety  Endocrine: Within normal limits  Hematologic/Lymphatic: Within normal limits  Allergic/Immunologic: Within normal limits    MEDICATIONS:     Pulmonary "  ALBUTerol/ipratropium for Nebulization 3 milliLiter(s) Nebulizer <User Schedule>  benzonatate 200 milliGRAM(s) Oral three times a day  buDESOnide   0.5 milliGRAM(s) Respule 0.5 milliGRAM(s) Inhalation two times a day  guaiFENesin/dextromethorphan  Syrup 10 milliLiter(s) Oral four times a day  HYDROcodone/homatropine Syrup 5 milliLiter(s) Oral at bedtime  montelukast 10 milliGRAM(s) Oral daily      Anti-microbials:  nystatin    Suspension 579242 Unit(s) Oral four times a day      Cardiovascular:  valsartan 160 milliGRAM(s) Oral daily  valsartan 80 milliGRAM(s) Oral daily      Other:  acetaminophen   Tablet. 650 milliGRAM(s) Oral every 6 hours PRN  enoxaparin Injectable 40 milliGRAM(s) SubCutaneous every 24 hours  melatonin 3 milliGRAM(s) Oral at bedtime  methylPREDNISolone sodium succinate Injectable 20 milliGRAM(s) IV Push every 8 hours        OBJECTIVE:    I&O's Detail    18 Jun 2018 07:01  -  19 Jun 2018 07:00  --------------------------------------------------------  IN:    Oral Fluid: 920 mL  Total IN: 920 mL    OUT:  Total OUT: 0 mL    Total NET: 920 mL    PHYSICAL EXAM:       ICU Vital Signs Last 24 Hrs  T(C): 36.6 (19 Jun 2018 06:06), Max: 36.9 (19 Jun 2018 00:31)  T(F): 97.8 (19 Jun 2018 06:06), Max: 98.4 (19 Jun 2018 00:31)  HR: 71 (19 Jun 2018 06:06) (71 - 90)  BP: 126/79 (19 Jun 2018 06:06) (126/79 - 149/91)  BP(mean): --  ABP: --  ABP(mean): --  RR: 18 (19 Jun 2018 06:06) (18 - 18)  SpO2: 98% (19 Jun 2018 06:06) (97% - 98%) on room air     General: Awake. Alert. Cooperative. No distress. Appears stated age 	  HEENT:   AT/NC/Anicteric. PERRL/EOMI. Improved thrush  Neck: Supple. Trachea midline. Thyroid without enlargement/tenderness/nodules. No carotid bruit. No JVD	  Cardiovascular: Regular rate and rhythm. S1 S2 normal. No M/R/G  Respiratory: Respirations unlabored. Decreased bilateral rhonchi and wheeze   Abdomen: Soft. Non-tender. Non-distended. No organomegaly. No masses. Normal BS  Extremities: Warm to touch. No CCE  Pulses: 2+ peripheral pulses all extremities	  Skin: Normal skin color. No rashes or lesions. No ecchymoses. No cyanosis.  Warm to touch  Lymph Nodes: Cervical, supraclavicular and axillary nodes normal  Neurological: Motor and sensory examination equal and normal. A and O x 3  Psychiatry: Anxious      LABS:    06-16    140  |  102  |  10  ----------------------------<  113<H>  4.3   |  24  |  0.70    06-15    134<L>  |  96  |  8   ----------------------------<  137<H>  4.3   |  21<L>  |  0.65    Ca      9.4      06-16    Ca      9.3      06-15    Phos    4.4     06-16      Mg       2.4     06-16    TPro  8.1  /  Alb  4.4  /  TBili  0.3  /  DBili  x   /  AST  19  /  ALT  20  /  AlkPhos  85  06-15    MICROBIOLOGY:     Rapid Respiratory Viral Panel (06.15.18 @ 14:55)    Rapid RVP Result: Detected: The [employer] RVP Rapid uses polymerase chain reaction (PCR) and melt  curve analysis to screen for adenovirus; coronavirus HKU1, NL63, 229E,  OC43; human metapneumovirus (hMPV); human enterovirus/rhinovirus  (Entero/RV); influenza A; influenza A/H1;influenza A/H3; influenza  A/H1-2009; influenza B; parainfluenza viruses 1, 2, 3, 4; respiratory  syncytial virus; Bordetella pertussis; Mycoplasma pneumoniae; and  Chlamydophila pneumoniae.    Parainfluenza 3 (RapRVP): Detected    RADIOLOGY:  [x] Chest radiographs reviewed and interpreted by me    < from: Xray Chest 2 Views PA/Lat (06.10.18 @ 00:19) >    EXAM:  XR CHEST PA LAT 2V                            PROCEDURE DATE:  06/10/2018      INTERPRETATION:  CLINICAL INFORMATION: Shortness of breath and burning   chest pain.    TECHNIQUE: PA and lateral views of the chest on 6/10/2018 12:19 AM.    COMPARISON: None available    FINDINGS:     No focal consolidations, pleural effusions, or pneumothorax.  The heart is normal in size.  Degenerative changes of the thoracic spine.    IMPRESSION:   Clear lungs.  Normal heart size.    FEDERICO THACKER M.D., RADIOLOGY RESIDENT  This document has been electronically signed.  PALMA DONNELLY M.D., ATTENDING RADIOLOGIST  This document has been electronically signed. Jackson 10 2018  9:05AM      < end of copied text >  ---------------------------------------------------------------------------------------------------------  < from: Xray Chest 1 View- PORTABLE-Urgent (06.15.18 @ 17:30) >    EXAM:  XR CHEST PORTABLE URGENT 1V                          PROCEDURE DATE:  06/15/2018      INTERPRETATION:  CLINICAL INFORMATION: Shortness of breath    TECHNIQUE: AP view of the chest.    COMPARISON: Chest x-ray 6/10/2018    FINDINGS:     The lungs are clear. No pneumothorax.  The cardiomediastinal silhouette is unremarkable.  The visualized osseous structures are unremarkable for age.    IMPRESSION:   Clear lungs.    ROSELINE WALKER M.D., RADIOLOGY RESIDENT  This document has been electronically signed.  MANUEL MENENDEZ M.D., ATTENDING RADIOLOGIST  This document has been electronically signed. Jun 16 2018  8:16AM     < end of copied text >  ---------------------------------------------------------------------------------------------------------

## 2018-06-19 NOTE — PROGRESS NOTE ADULT - NSHPATTENDINGPLANDISCUSS_GEN_ALL_CORE
Use Compound W nightly on the wart and cover with Band-Aid.  It should resolve in a few weeks and follow off.  Return if it recurs.  Broken Collarbone (Child)  Your child has a broken collarbone (fractured clavicle). The collarbone connects the breast bone to the shoulder. This injury may cause pain, swelling, bruising, and a bump (deformity) around the break. A more serious collarbone break may harm nerves and blood vessels in the area, as well as the lungs.  Children can break their collarbone by falling on a shoulder. Infants can break their collarbone  during delivery. This may happen because of greater than normal birth weight.  A broken collarbone is usually diagnosed by an X-ray or CT scan.  But the break may not show up on the first X-rays done, especially in children. Your child may need follow-up X-rays if the break cant be seen. These are usually done in 10 to 14 days. At that time, they may show that the break is healing.  A broken collarbone is usually treated with a shoulder immobilizer or sling. Younger children often need to keep the shoulder in the immobilizer for 2 to 4 weeks. Adolescents typically need to keep the shoulder immobilized for 4 to 8 weeks. Your child will need to start range-of-motion exercises when the pain from the injury eases. Only rarely is surgery needed for a broken collarbone.  Even after the break heals, your child may have a bump at the site of the fracture.  This may get smaller over the next 6 to 9 months. But sometimes the bump never goes away.   Your childs health care provider will tell you when your child can go back to playing contact sports. At that point, your child should no longer have any pain when moving the shoulder. He or she should also have regained shoulder strength. This usually takes 6 to 8 weeks.  Home care  Your childs health care provider may prescribe medicines for pain. Follow the providers instructions for giving these medicines to your child.  Dont give your child aspirin unless the provider tells you to.  General care  · Put an ice pack on the injured area. Do this for 20 minutes every 1 to 2 hours the first day for pain relief. You can make an ice pack by wrapping a plastic bag of ice cubes in a thin towel. Dont put the ice directly on the skin, because this can cause damage. Continue using the ice pack 3 to 4 times a day for the next 2 days. Then use the ice pack as needed to ease pain and swelling. You can put the cold pack directly on the shoulder immobilizer or sling.  · If your child has a sling, he or she can take it off for bathing and sleeping.  · Your child should avoid raising the injured arm overhead until he or she can do this without pain.  · Encourage your child to wiggle or exercise the fingers of the hand on the injured side often.  Follow-up care  Follow up with your childs health care provider, or as advised. Your child may need follow-up X-rays to see how the bone is healing. If you were referred to a specialist, make that appointment as soon as you can.  Special note to parents  Health care providers are trained to recognize injuries like this one in young children as a sign of possible abuse. Several health care providers may ask questions about how your child was injured. Health care providers are required by law to ask you these questions. This is done for protection of the child. Please try to be patient and not take offense.  Call 911  Call 911 if any of these occur:  · Trouble breathing  · Confusion  · Very drowsy or trouble awakening  · Fainting or loss of consciousness  · Rapid heart rate  · Seizure  · Stiff neck  When to seek medical advice  Call your child's health care provider right away if any of these occur:  · Area of bruising over the collarbone gets larger  · Hand or fingers of the affected arm on the injured side become swollen, numb, cold, burning, or blue  · Pain or swelling gets worse. Babies too young to talk  may show pain with crying that can't be soothed.  · Your child cant move the fingers of the hand of the injured collarbone  · Tingling in the fingers of the hand of the injured collarbone that is new or getting worse  Also call your childs provider right away if your child has a fever:  · Your child is younger than 12 weeks and has a fever of 100.4°F (38°C) or higher. Your baby may need to be seen by his or her health care provider.  · Your child has repeated fevers above 104°F (40°C) at any age.  · Your child is younger than 2 years old and his or her fever continues for more than 24 hours.  · Your child is 2 years old or older and his or her fever continues for more than 3 days.  Date Last Reviewed: 2/15/2015  © 2181-2514 [employer]. [address] [place] [address]. All rights reserved. This information is not intended as a substitute for professional medical care. Always follow your healthcare professional's instructions.        Skin Ringworm (Child)  Ringworm is a skin infection caused by a fungus. It is not caused by a worm. Ringworm is contagious. It can be spread by contact with people or animals infected with the fungus. It can also be spread by contact with an object that is contaminated by infected person or animal.  A ringworm infection causes a red, ring-shaped patch on the skin. The rash may be small or a couple of inches across. The ring is often clear in the center with a scaly, red border. The area is dry, scaly, itchy, and flaky. There may also be blisters. These can ooze clear or cloudy fluid (pus). It can be diagnosed by the appearance of the rash or a scraping may be taken for testing.  Ringworm is most often treated with antifungal cream. It may take a week before the infection starts to go away. It may take a few weeks to clear completely. When the infection is gone, the skin may have scarring.  Home care  Your childs healthcare provider may prescribe a cream to kill  the fungus. Or you may be told to buy a cream at the drugstore. Some creams are available without a prescription. You may also be advised to use medicine to help ease itching. Follow all instructions for using any medicine on your child.  General care  · If your child was prescribed a cream, apply it exactly as directed. Be sure to avoid direct contact with the rash. Wash your hands with soap and warm water before and after applying the cream. This is to avoid spreading the fungus.  · Make sure your child does not scratch the affected area. This can delay healing and may spread the infection. It can also cause a bacterial infection. You may need to use scratch mittens that cover your childs hands. Keep his or her fingernails trimmed short.  · If there are blisters, apply a clean compress dipped in Burows solution (aluminum acetate solution). This is available in stores without a prescription.  · Wash any items such as clothing, blankets, bedding, or toys that may have touched the infection.  · Apply wet compresses to the rash to help relieve itching.  · Check your childs skin every day for the signs listed below.  Special note to parents  Ringworm of the skin is very contagious. Keep your child from close contact with others and out of day care or school until treatment has been started unless the lesion can be covered completely. Any child with ringworm should not participate in gym, swimming, and other close contact activities that are likely to expose others until after treatment has begun or the lesions can be completely covered. Athletes should follow their healthcare provider's recommendations and the specific sports league rules for returning to practice and competition. Wash your hands well with soap and warm water before and after caring for your child. This is to help avoid spreading the infection.  Follow-up care  Follow up with your childs healthcare provider, or as advised.  When to seek medical  advice  Call your childs healthcare provider right away if any of these occur:  · Your child is younger than 12 weeks and has a fever of 100.4°F (38°C) or higher because your baby may need to be seen by their healthcare provider.  · Your child has repeated fevers above 104°F (40°C) at any age.  · Your child is younger than 2 years old and his or her fever continues for more than 24 hours or your child is 2 years old and older and his or her fever continues for more than 3 days.  · Rash that does not improve after 10 days of treatment  · Rash that spreads to other areas of the body  · Redness or swelling that gets worse  · Fussiness or crying that cannot be soothed  · Foul-smelling fluid leaking from the skin   Date Last Reviewed: 12/24/2015  © 2335-5112 "[employer] [address] [place] [address], [place] [address]. All rights reserved. This information is not intended as a substitute for professional medical care. Always follow your healthcare professional's instructions.         patient and pulmonary

## 2018-06-19 NOTE — PROGRESS NOTE ADULT - SUBJECTIVE AND OBJECTIVE BOX
Patient is a 53y old  Female who presents with a chief complaint of wheezing (18 Jun 2018 11:06)      SUBJECTIVE / OVERNIGHT EVENTS:  doest feel well.  does no appear sob or in distress.    MEDICATIONS  (STANDING):  ALBUTerol/ipratropium for Nebulization 3 milliLiter(s) Nebulizer <User Schedule>  benzonatate 200 milliGRAM(s) Oral three times a day  buDESOnide   0.5 milliGRAM(s) Respule 0.5 milliGRAM(s) Inhalation two times a day  enoxaparin Injectable 40 milliGRAM(s) SubCutaneous every 24 hours  guaiFENesin/dextromethorphan  Syrup 10 milliLiter(s) Oral four times a day  HYDROcodone/homatropine Syrup 5 milliLiter(s) Oral at bedtime  melatonin 3 milliGRAM(s) Oral at bedtime  methylPREDNISolone sodium succinate Injectable 20 milliGRAM(s) IV Push every 8 hours  montelukast 10 milliGRAM(s) Oral daily  nystatin    Suspension 206120 Unit(s) Oral four times a day  valsartan 160 milliGRAM(s) Oral daily  valsartan 80 milliGRAM(s) Oral daily    MEDICATIONS  (PRN):  acetaminophen   Tablet. 650 milliGRAM(s) Oral every 6 hours PRN Moderate Pain (4 - 6)      Vital Signs Last 24 Hrs  T(C): 36.6 (19 Jun 2018 10:40), Max: 36.9 (19 Jun 2018 00:31)  T(F): 97.8 (19 Jun 2018 10:40), Max: 98.4 (19 Jun 2018 00:31)  HR: 107 (19 Jun 2018 10:40) (71 - 107)  BP: 127/88 (19 Jun 2018 10:40) (126/79 - 133/65)  BP(mean): --  RR: 18 (19 Jun 2018 10:40) (18 - 18)  SpO2: 95% (19 Jun 2018 10:40) (95% - 98%)  CAPILLARY BLOOD GLUCOSE        I&O's Summary    18 Jun 2018 07:01  -  19 Jun 2018 07:00  --------------------------------------------------------  IN: 920 mL / OUT: 0 mL / NET: 920 mL    19 Jun 2018 07:01  -  19 Jun 2018 15:21  --------------------------------------------------------  IN: 240 mL / OUT: 0 mL / NET: 240 mL        PHYSICAL EXAM:  GENERAL: NAD, well-developed  HEAD:  Atraumatic, Normocephalic  EYES: EOMI, PERRLA, conjunctiva and sclera clear  NECK: Supple, No JVD  CHEST/LUNG: eve wheeze and ronchi  HEART: Regular rate and rhythm; No murmurs, rubs, or gallops  ABDOMEN: Soft, Nontender, Nondistended; Bowel sounds present  EXTREMITIES:  2+ Peripheral Pulses, No clubbing, cyanosis, or edema  PSYCH: AAOx3  NEUROLOGY: non-focal  SKIN: No rashes or lesions    LABS:                    RADIOLOGY & ADDITIONAL TESTS:    Imaging Personally Reviewed:    < from: CT Chest No Cont (06.19.18 @ 11:08) >  IMPRESSION:  No focal consolidations. Trace basilar subsegmental linear atelectasis   and bibasilar areas of mucoid impaction.      < end of copied text >    Consultant(s) Notes Reviewed:      Care Discussed with Consultants/Other Providers:

## 2018-06-20 VITALS
OXYGEN SATURATION: 98 % | DIASTOLIC BLOOD PRESSURE: 78 MMHG | HEART RATE: 81 BPM | RESPIRATION RATE: 18 BRPM | TEMPERATURE: 98 F | SYSTOLIC BLOOD PRESSURE: 114 MMHG

## 2018-06-20 LAB
ANION GAP SERPL CALC-SCNC: 13 MMOL/L — SIGNIFICANT CHANGE UP (ref 5–17)
BUN SERPL-MCNC: 13 MG/DL — SIGNIFICANT CHANGE UP (ref 7–23)
CALCIUM SERPL-MCNC: 9.4 MG/DL — SIGNIFICANT CHANGE UP (ref 8.4–10.5)
CHLORIDE SERPL-SCNC: 97 MMOL/L — SIGNIFICANT CHANGE UP (ref 96–108)
CO2 SERPL-SCNC: 24 MMOL/L — SIGNIFICANT CHANGE UP (ref 22–31)
CREAT SERPL-MCNC: 0.61 MG/DL — SIGNIFICANT CHANGE UP (ref 0.5–1.3)
GLUCOSE SERPL-MCNC: 128 MG/DL — HIGH (ref 70–99)
HCT VFR BLD CALC: 43.1 % — SIGNIFICANT CHANGE UP (ref 34.5–45)
HGB BLD-MCNC: 14.3 G/DL — SIGNIFICANT CHANGE UP (ref 11.5–15.5)
MCHC RBC-ENTMCNC: 27.5 PG — SIGNIFICANT CHANGE UP (ref 27–34)
MCHC RBC-ENTMCNC: 33.2 GM/DL — SIGNIFICANT CHANGE UP (ref 32–36)
MCV RBC AUTO: 82.9 FL — SIGNIFICANT CHANGE UP (ref 80–100)
PLATELET # BLD AUTO: 445 K/UL — HIGH (ref 150–400)
POTASSIUM SERPL-MCNC: 4.5 MMOL/L — SIGNIFICANT CHANGE UP (ref 3.5–5.3)
POTASSIUM SERPL-SCNC: 4.5 MMOL/L — SIGNIFICANT CHANGE UP (ref 3.5–5.3)
RBC # BLD: 5.2 M/UL — SIGNIFICANT CHANGE UP (ref 3.8–5.2)
RBC # FLD: 13.7 % — SIGNIFICANT CHANGE UP (ref 10.3–14.5)
SODIUM SERPL-SCNC: 134 MMOL/L — LOW (ref 135–145)
WBC # BLD: 19.84 K/UL — HIGH (ref 3.8–10.5)
WBC # FLD AUTO: 19.84 K/UL — HIGH (ref 3.8–10.5)

## 2018-06-20 PROCEDURE — 84295 ASSAY OF SERUM SODIUM: CPT

## 2018-06-20 PROCEDURE — 71250 CT THORAX DX C-: CPT

## 2018-06-20 PROCEDURE — 83605 ASSAY OF LACTIC ACID: CPT

## 2018-06-20 PROCEDURE — 93005 ELECTROCARDIOGRAM TRACING: CPT

## 2018-06-20 PROCEDURE — 87486 CHLMYD PNEUM DNA AMP PROBE: CPT

## 2018-06-20 PROCEDURE — 71045 X-RAY EXAM CHEST 1 VIEW: CPT

## 2018-06-20 PROCEDURE — 85014 HEMATOCRIT: CPT

## 2018-06-20 PROCEDURE — 84443 ASSAY THYROID STIM HORMONE: CPT

## 2018-06-20 PROCEDURE — 80053 COMPREHEN METABOLIC PANEL: CPT

## 2018-06-20 PROCEDURE — 87581 M.PNEUMON DNA AMP PROBE: CPT

## 2018-06-20 PROCEDURE — 84100 ASSAY OF PHOSPHORUS: CPT

## 2018-06-20 PROCEDURE — 82803 BLOOD GASES ANY COMBINATION: CPT

## 2018-06-20 PROCEDURE — 85027 COMPLETE CBC AUTOMATED: CPT

## 2018-06-20 PROCEDURE — 82330 ASSAY OF CALCIUM: CPT

## 2018-06-20 PROCEDURE — 83735 ASSAY OF MAGNESIUM: CPT

## 2018-06-20 PROCEDURE — 99285 EMERGENCY DEPT VISIT HI MDM: CPT | Mod: 25

## 2018-06-20 PROCEDURE — 96374 THER/PROPH/DIAG INJ IV PUSH: CPT

## 2018-06-20 PROCEDURE — 87798 DETECT AGENT NOS DNA AMP: CPT

## 2018-06-20 PROCEDURE — 84132 ASSAY OF SERUM POTASSIUM: CPT

## 2018-06-20 PROCEDURE — 82947 ASSAY GLUCOSE BLOOD QUANT: CPT

## 2018-06-20 PROCEDURE — 87633 RESP VIRUS 12-25 TARGETS: CPT

## 2018-06-20 PROCEDURE — 94640 AIRWAY INHALATION TREATMENT: CPT

## 2018-06-20 PROCEDURE — 80048 BASIC METABOLIC PNL TOTAL CA: CPT

## 2018-06-20 PROCEDURE — 82435 ASSAY OF BLOOD CHLORIDE: CPT

## 2018-06-20 PROCEDURE — 82607 VITAMIN B-12: CPT

## 2018-06-20 RX ORDER — CEFUROXIME AXETIL 250 MG
1 TABLET ORAL
Qty: 6 | Refills: 0 | OUTPATIENT
Start: 2018-06-20 | End: 2018-06-22

## 2018-06-20 RX ORDER — GUAIFENESIN/DEXTROMETHORPHAN 600MG-30MG
10 TABLET, EXTENDED RELEASE 12 HR ORAL
Qty: 1 | Refills: 0 | OUTPATIENT
Start: 2018-06-20 | End: 2018-06-24

## 2018-06-20 RX ORDER — IPRATROPIUM/ALBUTEROL SULFATE 18-103MCG
3 AEROSOL WITH ADAPTER (GRAM) INHALATION
Qty: 26 | Refills: 0 | OUTPATIENT
Start: 2018-06-20 | End: 2018-07-03

## 2018-06-20 RX ORDER — BUDESONIDE AND FORMOTEROL FUMARATE DIHYDRATE 160; 4.5 UG/1; UG/1
2 AEROSOL RESPIRATORY (INHALATION)
Qty: 1 | Refills: 0 | OUTPATIENT
Start: 2018-06-20 | End: 2018-07-03

## 2018-06-20 RX ORDER — BUDESONIDE AND FORMOTEROL FUMARATE DIHYDRATE 160; 4.5 UG/1; UG/1
2 AEROSOL RESPIRATORY (INHALATION)
Qty: 0 | Refills: 0 | COMMUNITY

## 2018-06-20 RX ORDER — LEVALBUTEROL 1.25 MG/.5ML
2 SOLUTION, CONCENTRATE RESPIRATORY (INHALATION)
Qty: 0 | Refills: 0 | COMMUNITY

## 2018-06-20 RX ORDER — ALBUTEROL 90 UG/1
3 AEROSOL, METERED ORAL
Qty: 0 | Refills: 0 | COMMUNITY

## 2018-06-20 RX ORDER — CEFUROXIME AXETIL 250 MG
1 TABLET ORAL
Qty: 0 | Refills: 0 | COMMUNITY
Start: 2018-06-20

## 2018-06-20 RX ORDER — ALBUTEROL 90 UG/1
3 AEROSOL, METERED ORAL
Qty: 24 | Refills: 0 | OUTPATIENT
Start: 2018-06-20 | End: 2018-07-03

## 2018-06-20 RX ADMIN — Medication 500 MILLIGRAM(S): at 06:15

## 2018-06-20 RX ADMIN — Medication 500000 UNIT(S): at 06:16

## 2018-06-20 RX ADMIN — MONTELUKAST 10 MILLIGRAM(S): 4 TABLET, CHEWABLE ORAL at 13:48

## 2018-06-20 RX ADMIN — VALSARTAN 160 MILLIGRAM(S): 80 TABLET ORAL at 06:16

## 2018-06-20 RX ADMIN — Medication 3 MILLILITER(S): at 00:27

## 2018-06-20 RX ADMIN — Medication 200 MILLIGRAM(S): at 13:48

## 2018-06-20 RX ADMIN — Medication 20 MILLIGRAM(S): at 13:46

## 2018-06-20 RX ADMIN — Medication 3 MILLILITER(S): at 13:48

## 2018-06-20 RX ADMIN — Medication 200 MILLIGRAM(S): at 06:16

## 2018-06-20 RX ADMIN — VALSARTAN 80 MILLIGRAM(S): 80 TABLET ORAL at 06:16

## 2018-06-20 RX ADMIN — Medication 0.5 MILLIGRAM(S): at 06:23

## 2018-06-20 RX ADMIN — Medication 3 MILLILITER(S): at 08:36

## 2018-06-20 RX ADMIN — Medication 500000 UNIT(S): at 13:47

## 2018-06-20 RX ADMIN — Medication 500000 UNIT(S): at 00:26

## 2018-06-20 RX ADMIN — Medication 20 MILLIGRAM(S): at 06:15

## 2018-06-20 NOTE — PROGRESS NOTE ADULT - SUBJECTIVE AND OBJECTIVE BOX
Follow-up Pulm Progress Note    No new respiratory events overnight.  Denies increased SOB, chest pain, cough or mucus.  feelsl a little better.  still with wheeze, but less nervous overall.  no increased sputum    Medications:  MEDICATIONS  (STANDING):  ALBUTerol/ipratropium for Nebulization 3 milliLiter(s) Nebulizer <User Schedule>  benzonatate 200 milliGRAM(s) Oral three times a day  buDESOnide   0.5 milliGRAM(s) Respule 0.5 milliGRAM(s) Inhalation two times a day  cefuroxime   Tablet 500 milliGRAM(s) Oral every 12 hours  enoxaparin Injectable 40 milliGRAM(s) SubCutaneous every 24 hours  guaiFENesin/dextromethorphan  Syrup 10 milliLiter(s) Oral four times a day  HYDROcodone/homatropine Syrup 5 milliLiter(s) Oral at bedtime  melatonin 3 milliGRAM(s) Oral at bedtime  methylPREDNISolone sodium succinate Injectable 20 milliGRAM(s) IV Push every 8 hours  montelukast 10 milliGRAM(s) Oral daily  nystatin    Suspension [id number] Unit(s) Oral four times a day  valsartan 160 milliGRAM(s) Oral daily  valsartan 80 milliGRAM(s) Oral daily    MEDICATIONS  (PRN):  acetaminophen   Tablet. 650 milliGRAM(s) Oral every 6 hours PRN Moderate Pain (4 - 6)      Vent settings (if applicable)      Vital Signs Last 24 Hrs  T(C): 36.5 (20 Jun 2018 06:26), Max: 36.6 (19 Jun 2018 10:40)  T(F): 97.7 (20 Jun 2018 06:26), Max: 97.8 (19 Jun 2018 10:40)  HR: 73 (20 Jun 2018 06:26) (73 - 107)  BP: 127/84 (20 Jun 2018 06:26) (127/84 - 139/71)  BP(mean): --  RR: 18 (20 Jun 2018 06:26) (18 - 18)  SpO2: 97% (20 Jun 2018 06:26) (95% - 98%)          06-19 @ 07:01  -  06-20 @ 07:00  --------------------------------------------------------  IN: 600 mL / OUT: 0 mL / NET: 600 mL          LABS:                        14.3   19.84 )-----------( 445      ( 20 Jun 2018 08:14 )             43.1     06-20    134<L>  |  97  |  13  ----------------------------<  128<H>  4.5   |  24  |  0.61    Ca    9.4      20 Jun 2018 07:08            CAPILLARY BLOOD GLUCOSE                  CULTURES:        Physical Examination:  Awake and alert, generally comfortable  HEENT: unremarkable  PULM: Coarse bs to auscultation bilaterally, no significant sputum production, good airflow  CVS: Regular rate and rhythm, no murmurs, rubs, or gallops  Abd:  soft, non tender  Extrem: No CCE    RADIOLOGY REVIEWED  CXR:    CT chest:

## 2018-06-20 NOTE — PROGRESS NOTE ADULT - SUBJECTIVE AND OBJECTIVE BOX
Patient is a 53y old  Female who presents with a chief complaint of wheezing (18 Jun 2018 11:06)      SUBJECTIVE / OVERNIGHT EVENTS: feels better today    MEDICATIONS  (STANDING):  ALBUTerol/ipratropium for Nebulization 3 milliLiter(s) Nebulizer <User Schedule>  benzonatate 200 milliGRAM(s) Oral three times a day  buDESOnide   0.5 milliGRAM(s) Respule 0.5 milliGRAM(s) Inhalation two times a day  cefuroxime   Tablet 500 milliGRAM(s) Oral every 12 hours  enoxaparin Injectable 40 milliGRAM(s) SubCutaneous every 24 hours  guaiFENesin/dextromethorphan  Syrup 10 milliLiter(s) Oral four times a day  HYDROcodone/homatropine Syrup 5 milliLiter(s) Oral at bedtime  melatonin 3 milliGRAM(s) Oral at bedtime  methylPREDNISolone sodium succinate Injectable 20 milliGRAM(s) IV Push every 8 hours  montelukast 10 milliGRAM(s) Oral daily  nystatin    Suspension 314671 Unit(s) Oral four times a day  valsartan 160 milliGRAM(s) Oral daily  valsartan 80 milliGRAM(s) Oral daily    MEDICATIONS  (PRN):  acetaminophen   Tablet. 650 milliGRAM(s) Oral every 6 hours PRN Moderate Pain (4 - 6)      Vital Signs Last 24 Hrs  T(C): 36.9 (20 Jun 2018 10:50), Max: 36.9 (20 Jun 2018 10:50)  T(F): 98.4 (20 Jun 2018 10:50), Max: 98.4 (20 Jun 2018 10:50)  HR: 81 (20 Jun 2018 10:50) (73 - 82)  BP: 114/78 (20 Jun 2018 10:50) (114/78 - 139/71)  BP(mean): --  RR: 18 (20 Jun 2018 10:50) (18 - 18)  SpO2: 98% (20 Jun 2018 10:50) (97% - 98%)  CAPILLARY BLOOD GLUCOSE        I&O's Summary    19 Jun 2018 07:01  -  20 Jun 2018 07:00  --------------------------------------------------------  IN: 600 mL / OUT: 0 mL / NET: 600 mL    20 Jun 2018 07:01  -  20 Jun 2018 12:12  --------------------------------------------------------  IN: 240 mL / OUT: 0 mL / NET: 240 mL        PHYSICAL EXAM:  GENERAL: NAD, well-developed  HEAD:  Atraumatic, Normocephalic  EYES: EOMI, PERRLA, conjunctiva and sclera clear  NECK: Supple, No JVD  CHEST/LUNG: Clear to auscultation bilaterally; No wheeze  HEART: Regular rate and rhythm; No murmurs, rubs, or gallops  ABDOMEN: Soft, Nontender, Nondistended; Bowel sounds present  EXTREMITIES:  2+ Peripheral Pulses, No clubbing, cyanosis, or edema  PSYCH: AAOx3  NEUROLOGY: non-focal  SKIN: No rashes or lesions    LABS:                        14.3   19.84 )-----------( 445      ( 20 Jun 2018 08:14 )             43.1     06-20    134<L>  |  97  |  13  ----------------------------<  128<H>  4.5   |  24  |  0.61    Ca    9.4      20 Jun 2018 07:08                RADIOLOGY & ADDITIONAL TESTS:    Imaging Personally Reviewed:    Consultant(s) Notes Reviewed:      Care Discussed with Consultants/Other Providers:

## 2019-07-15 NOTE — ED PROVIDER NOTE - NS ED MD TWO NIGHTS YN
Continue daily weights  If you gain 3 lbs in 24 hour or 5 lbs in one week, please call the office  Keep sodium <2 g per day, fluids <2 L per day  Increase Metoprolol to 37 5 mg twice daily for better heart rate control  COntiue to monitor BP and HR daily  Schedule sleep study in upcoming weeks  Stress test after you see EP  Schedule follow up with EP now  Lab work in 2 weeks 
No

## 2019-07-24 ENCOUNTER — RESULT REVIEW (OUTPATIENT)
Age: 54
End: 2019-07-24

## 2019-08-20 NOTE — ED PROVIDER NOTE - MUSCULOSKELETAL, MLM
20-Aug-2019 14:28 Spine appears normal, range of motion is not limited, no muscle or joint tenderness

## 2020-01-19 ENCOUNTER — EMERGENCY (EMERGENCY)
Facility: HOSPITAL | Age: 55
LOS: 1 days | Discharge: LEFT BEFORE DISCHARGE | End: 2020-01-19
Admitting: EMERGENCY MEDICINE

## 2020-01-19 DIAGNOSIS — Z98.89 OTHER SPECIFIED POSTPROCEDURAL STATES: Chronic | ICD-10-CM

## 2020-01-19 DIAGNOSIS — Z98.890 OTHER SPECIFIED POSTPROCEDURAL STATES: Chronic | ICD-10-CM

## 2020-01-20 ENCOUNTER — EMERGENCY (EMERGENCY)
Facility: HOSPITAL | Age: 55
LOS: 1 days | Discharge: HOME | End: 2020-01-20
Attending: EMERGENCY MEDICINE | Admitting: EMERGENCY MEDICINE
Payer: COMMERCIAL

## 2020-01-20 VITALS
SYSTOLIC BLOOD PRESSURE: 155 MMHG | TEMPERATURE: 98 F | DIASTOLIC BLOOD PRESSURE: 89 MMHG | OXYGEN SATURATION: 99 % | HEART RATE: 88 BPM | RESPIRATION RATE: 16 BRPM

## 2020-01-20 DIAGNOSIS — Z98.890 OTHER SPECIFIED POSTPROCEDURAL STATES: Chronic | ICD-10-CM

## 2020-01-20 DIAGNOSIS — Z98.89 OTHER SPECIFIED POSTPROCEDURAL STATES: Chronic | ICD-10-CM

## 2020-01-20 PROBLEM — G62.9 POLYNEUROPATHY, UNSPECIFIED: Chronic | Status: ACTIVE | Noted: 2018-06-15

## 2020-01-20 LAB
ALBUMIN SERPL ELPH-MCNC: 4.7 G/DL — SIGNIFICANT CHANGE UP (ref 3.3–5)
ALP SERPL-CCNC: 96 U/L — SIGNIFICANT CHANGE UP (ref 40–120)
ALT FLD-CCNC: 22 U/L — SIGNIFICANT CHANGE UP (ref 4–33)
ANION GAP SERPL CALC-SCNC: 12 MMO/L — SIGNIFICANT CHANGE UP (ref 7–14)
ANION GAP SERPL CALC-SCNC: 14 MMO/L — SIGNIFICANT CHANGE UP (ref 7–14)
APPEARANCE UR: CLEAR — SIGNIFICANT CHANGE UP
AST SERPL-CCNC: 16 U/L — SIGNIFICANT CHANGE UP (ref 4–32)
BASOPHILS # BLD AUTO: 0.05 K/UL — SIGNIFICANT CHANGE UP (ref 0–0.2)
BASOPHILS NFR BLD AUTO: 0.4 % — SIGNIFICANT CHANGE UP (ref 0–2)
BILIRUB SERPL-MCNC: 0.4 MG/DL — SIGNIFICANT CHANGE UP (ref 0.2–1.2)
BILIRUB UR-MCNC: NEGATIVE — SIGNIFICANT CHANGE UP
BLOOD UR QL VISUAL: SIGNIFICANT CHANGE UP
BUN SERPL-MCNC: 8 MG/DL — SIGNIFICANT CHANGE UP (ref 7–23)
BUN SERPL-MCNC: 9 MG/DL — SIGNIFICANT CHANGE UP (ref 7–23)
CALCIUM SERPL-MCNC: 9 MG/DL — SIGNIFICANT CHANGE UP (ref 8.4–10.5)
CALCIUM SERPL-MCNC: 9.8 MG/DL — SIGNIFICANT CHANGE UP (ref 8.4–10.5)
CHLORIDE SERPL-SCNC: 91 MMOL/L — LOW (ref 98–107)
CHLORIDE SERPL-SCNC: 96 MMOL/L — LOW (ref 98–107)
CHLORIDE UR-SCNC: < 20 MMOL/L — SIGNIFICANT CHANGE UP
CO2 SERPL-SCNC: 20 MMOL/L — LOW (ref 22–31)
CO2 SERPL-SCNC: 24 MMOL/L — SIGNIFICANT CHANGE UP (ref 22–31)
COLOR SPEC: COLORLESS — SIGNIFICANT CHANGE UP
CREAT SERPL-MCNC: 0.62 MG/DL — SIGNIFICANT CHANGE UP (ref 0.5–1.3)
CREAT SERPL-MCNC: 0.68 MG/DL — SIGNIFICANT CHANGE UP (ref 0.5–1.3)
EOSINOPHIL # BLD AUTO: 0.19 K/UL — SIGNIFICANT CHANGE UP (ref 0–0.5)
EOSINOPHIL NFR BLD AUTO: 1.6 % — SIGNIFICANT CHANGE UP (ref 0–6)
GLUCOSE SERPL-MCNC: 101 MG/DL — HIGH (ref 70–99)
GLUCOSE SERPL-MCNC: 103 MG/DL — HIGH (ref 70–99)
GLUCOSE UR-MCNC: NEGATIVE — SIGNIFICANT CHANGE UP
HCT VFR BLD CALC: 44.1 % — SIGNIFICANT CHANGE UP (ref 34.5–45)
HGB BLD-MCNC: 14.1 G/DL — SIGNIFICANT CHANGE UP (ref 11.5–15.5)
IMM GRANULOCYTES NFR BLD AUTO: 0.7 % — SIGNIFICANT CHANGE UP (ref 0–1.5)
KETONES UR-MCNC: NEGATIVE — SIGNIFICANT CHANGE UP
LEUKOCYTE ESTERASE UR-ACNC: NEGATIVE — SIGNIFICANT CHANGE UP
LYMPHOCYTES # BLD AUTO: 18.2 % — SIGNIFICANT CHANGE UP (ref 13–44)
LYMPHOCYTES # BLD AUTO: 2.13 K/UL — SIGNIFICANT CHANGE UP (ref 1–3.3)
MCHC RBC-ENTMCNC: 26.5 PG — LOW (ref 27–34)
MCHC RBC-ENTMCNC: 32 % — SIGNIFICANT CHANGE UP (ref 32–36)
MCV RBC AUTO: 82.7 FL — SIGNIFICANT CHANGE UP (ref 80–100)
MONOCYTES # BLD AUTO: 0.88 K/UL — SIGNIFICANT CHANGE UP (ref 0–0.9)
MONOCYTES NFR BLD AUTO: 7.5 % — SIGNIFICANT CHANGE UP (ref 2–14)
NEUTROPHILS # BLD AUTO: 8.4 K/UL — HIGH (ref 1.8–7.4)
NEUTROPHILS NFR BLD AUTO: 71.6 % — SIGNIFICANT CHANGE UP (ref 43–77)
NITRITE UR-MCNC: NEGATIVE — SIGNIFICANT CHANGE UP
NRBC # FLD: 0 K/UL — SIGNIFICANT CHANGE UP (ref 0–0)
OSMOLALITY UR: 108 MOSMO/KG — SIGNIFICANT CHANGE UP (ref 50–1200)
PH UR: 6.5 — SIGNIFICANT CHANGE UP (ref 5–8)
PLATELET # BLD AUTO: 396 K/UL — SIGNIFICANT CHANGE UP (ref 150–400)
PMV BLD: 9 FL — SIGNIFICANT CHANGE UP (ref 7–13)
POTASSIUM SERPL-MCNC: 3.8 MMOL/L — SIGNIFICANT CHANGE UP (ref 3.5–5.3)
POTASSIUM SERPL-MCNC: 4.3 MMOL/L — SIGNIFICANT CHANGE UP (ref 3.5–5.3)
POTASSIUM SERPL-SCNC: 3.8 MMOL/L — SIGNIFICANT CHANGE UP (ref 3.5–5.3)
POTASSIUM SERPL-SCNC: 4.3 MMOL/L — SIGNIFICANT CHANGE UP (ref 3.5–5.3)
POTASSIUM UR-SCNC: 7.4 MMOL/L — SIGNIFICANT CHANGE UP
PROT SERPL-MCNC: 8.1 G/DL — SIGNIFICANT CHANGE UP (ref 6–8.3)
PROT UR-MCNC: NEGATIVE — SIGNIFICANT CHANGE UP
RBC # BLD: 5.33 M/UL — HIGH (ref 3.8–5.2)
RBC # FLD: 12.7 % — SIGNIFICANT CHANGE UP (ref 10.3–14.5)
SODIUM SERPL-SCNC: 128 MMOL/L — LOW (ref 135–145)
SODIUM SERPL-SCNC: 129 MMOL/L — LOW (ref 135–145)
SODIUM UR-SCNC: < 20 MMOL/L — SIGNIFICANT CHANGE UP
SP GR SPEC: 1 — LOW (ref 1–1.04)
TROPONIN T, HIGH SENSITIVITY: < 6 NG/L — SIGNIFICANT CHANGE UP (ref ?–14)
UROBILINOGEN FLD QL: NORMAL — SIGNIFICANT CHANGE UP
WBC # BLD: 11.73 K/UL — HIGH (ref 3.8–10.5)
WBC # FLD AUTO: 11.73 K/UL — HIGH (ref 3.8–10.5)

## 2020-01-20 PROCEDURE — 71045 X-RAY EXAM CHEST 1 VIEW: CPT | Mod: 26

## 2020-01-20 PROCEDURE — 70450 CT HEAD/BRAIN W/O DYE: CPT | Mod: 26

## 2020-01-20 PROCEDURE — 99218: CPT

## 2020-01-20 RX ORDER — SODIUM CHLORIDE 9 MG/ML
1000 INJECTION INTRAMUSCULAR; INTRAVENOUS; SUBCUTANEOUS ONCE
Refills: 0 | Status: COMPLETED | OUTPATIENT
Start: 2020-01-20 | End: 2020-01-20

## 2020-01-20 RX ORDER — ACETAMINOPHEN 500 MG
975 TABLET ORAL ONCE
Refills: 0 | Status: COMPLETED | OUTPATIENT
Start: 2020-01-20 | End: 2020-01-20

## 2020-01-20 RX ORDER — ACETAMINOPHEN 500 MG
650 TABLET ORAL ONCE
Refills: 0 | Status: COMPLETED | OUTPATIENT
Start: 2020-01-20 | End: 2020-01-20

## 2020-01-20 RX ORDER — METOCLOPRAMIDE HCL 10 MG
10 TABLET ORAL ONCE
Refills: 0 | Status: COMPLETED | OUTPATIENT
Start: 2020-01-20 | End: 2020-01-20

## 2020-01-20 RX ADMIN — SODIUM CHLORIDE 2000 MILLILITER(S): 9 INJECTION INTRAMUSCULAR; INTRAVENOUS; SUBCUTANEOUS at 17:35

## 2020-01-20 RX ADMIN — Medication 975 MILLIGRAM(S): at 23:25

## 2020-01-20 RX ADMIN — Medication 975 MILLIGRAM(S): at 18:26

## 2020-01-20 RX ADMIN — Medication 650 MILLIGRAM(S): at 23:25

## 2020-01-20 NOTE — ED PROVIDER NOTE - OBJECTIVE STATEMENT
54yof w/ HTN has been feeling generally unwell for the past week or so with general malaise, headaches on and off, increased thirst and excess urination. Initially attributed symptoms to a change in the  of her valsartan so her PMD changed to HCTZ which has only exacerbated symptoms. This morning had BP in 170s-180s. Complains of a generalized pressure like headache which she normal gets with her BP being high. Denies any blurred vision, weakness, numbness, changes in speech. No head trauma. Endorses occasional palpitations. 54yof w/ HTN has been feeling generally unwell for the past week or so with general malaise, headaches on and off, dizziness, increased thirst and excess urination. Initially attributed symptoms to a change in the  of her valsartan so her PMD changed to HCTZ which has only exacerbated symptoms. States "I've been drinking non stop, so much water".  This morning had BP in 170s-180s. Complains of a generalized pressure like headache which she normal gets with her BP being high. Denies any blurred vision, weakness, numbness, changes in speech. No head trauma. Endorses occasional palpitations.

## 2020-01-20 NOTE — ED ADULT NURSE REASSESSMENT NOTE - NS ED NURSE REASSESS COMMENT FT1
Pt AOx4, admitted to CDU this time. Pt appears in NAD, report given to CDU RN at this time. Pt waiting for CDU pt to go upstairs and then will be  transported to CDU bed 10. will continue to monitor.
pt to be brought to CT and reporting she is very anxious. pts  states pt is behaving abnormally and states "my wife seems very off, she is normally very on top of things". pt repeat BMP sent. pt denies chest pain, sob. pt reports headache still present with minimal nausea. MD Montilla to be made aware
Pt AOx4, respirations even and unlabored b/l. pt admitted to CDU waiting bed assignment at this time. Pt appears in NAD, will continue to monitor.

## 2020-01-20 NOTE — ED CDU PROVIDER INITIAL DAY NOTE - PHYSICAL EXAMINATION
Vital signs reviewed.   CONSTITUTIONAL: Well-appearing; well-nourished; in no apparent distress. Non-toxic appearing.   HEAD: Normocephalic, atraumatic.  EYES: PERRL, EOM intact, conjunctiva and sclera WNL.  ENT: normal nose; no rhinorrhea;   NECK: Trachea midline   CARD: Normal S1, S2; no murmurs, rubs, or gallops noted.  RESP: Normal chest excursion with respiration; breath sounds clear and equal bilaterally; no wheezes, rhonchi, or rales.  EXT/MS: moves all extremities  SKIN: Normal for age and race  NEURO: Awake, alert, oriented x 3, no gross deficits.  PSYCH: Normal mood; appropriate affect.

## 2020-01-20 NOTE — ED CDU PROVIDER INITIAL DAY NOTE - MEDICAL DECISION MAKING DETAILS
Patient is a 54 y.o female with PMHx of HTN, HLD, asthma who presents to ED c/o  generally not feeling well over past few days along with intermittent elevated BP's at home a/w headache. Pt notes increased thirst, recently started HCTZ.  Pt seen and worked up in ED, ct head normal. Labs +hyponatremia of 129, 128. Pt transferred to CDU for; Fluid restriction, repeat labs, urine osm and electrolytes, vitals q4 and frequent re-evals. Patient is a 54 y.o female placed in CDU for symptomatic hyponatremia (HA, dizziness) thought to be attributable to polydipsia.  Will restrict fluids overnight, repeat labs, consider renal eval as needed. Exam is WNl and pt currently feeling well.

## 2020-01-20 NOTE — ED PROVIDER NOTE - PROGRESS NOTE DETAILS
Accepted to CDU d/t hyponatremia, suspected in setting of polydipsia/excessive thirst.  Will fluid restrict in CDU, AM labs, possible renal consult if not improving.

## 2020-01-20 NOTE — ED ADULT TRIAGE NOTE - CHIEF COMPLAINT QUOTE
Pt states she is having headache, blurry vision x4 days. Pt states she recently had a change in manufacture for her BP medications and since then has been having these symptoms.

## 2020-01-20 NOTE — ED CDU PROVIDER INITIAL DAY NOTE - ATTENDING CONTRIBUTION TO CARE
ED Attending (Dr Montilla): I have personally performed a face to face bedside history and physical examination of this patient.  I have discussed the case with the PA and  I have personally authored the following components: HPI, ROS, Physical Exam and MDM in addition to reviewing and revising the rest of the Provider Note. Patient is a 54 y.o female placed in CDU for symptomatic hyponatremia (HA, dizziness) thought to be attributable to polydipsia.  Will restrict fluids overnight, repeat labs, consider renal eval as needed. Exam is WNl and pt currently feeling well.

## 2020-01-20 NOTE — ED CDU PROVIDER INITIAL DAY NOTE - OBJECTIVE STATEMENT
HPI: Patient is a 54 y.o female with PMHx of HTN, HLD, asthma who presents to ED c/o  generally not feeling well over past few days along with intermittent elevated BP's at home a/w headache today. As per patient states that she has been more tired/fatigued recently. States she saw her PCP regarding symptoms and elevated BP's. Pt was initially on valsartan but just switched to 25 of HCTZ. Pt took first dose recently and states did not feel well, developed mild headache described as pressure like. Also notes some intermittent dizziness. Pt notes also increased thirst and dry mouth, recently started HCTZ.  Pt seen and worked up in ED, ct head normal. Labs +hyponatremia of 129, 128. Pt transferred to CDU for; Fluid restriction, repeat labs, urine osm and electrolytes, vitals q4 and frequent re-evals. Pt denies dysuria, hematuria, n/v/d, abdominal pain, back pain, neck pain, or any other complaints. HPI: Patient is a 54 y.o female with PMHx of HTN, HLD, asthma who presents to ED c/o  generally not feeling well over past few days along with intermittent elevated BP's at home a/w headache today. As per patient states that she has been more tired/fatigued recently. States she saw her PCP regarding symptoms and elevated BP's. Pt was initially on valsartan but just switched to 25 of HCTZ. Pt took first dose recently and states did not feel well, developed mild headache described as pressure like. Also notes some intermittent dizziness. Pt notes also increased thirst and dry mouth, recently started HCTZ.  Pt seen and worked up in ED, ct head normal. Labs +hyponatremia of 129, 128 thought to be due to polydipsia as she reports constant thirst and increased water intake. Pt transferred to CDU for; Fluid restriction, repeat labs, urine osm and electrolytes, vitals q4 and frequent re-evals. Pt denies dysuria, hematuria, n/v/d, abdominal pain, back pain, neck pain, or any other complaints.

## 2020-01-20 NOTE — ED PROVIDER NOTE - ATTENDING CONTRIBUTION TO CARE
Attending Attestation: Dr. Montilla  I have personally performed a history and physical examination of the patient and discussed management with the resident as well as the patient.  I reviewed the resident's note and agree with the documented findings and plan of care.  I have authored and modified critical sections of the Provider Note, including but not limited to HPI, Physical Exam and MDM. Pt p/w HA and dizziness, polydipsia, excessive thirst.  Recently changed meds.  HCTZ may worsen thirst, only took for one day yesterday.  Will send labs, provide IVF, CTH, reassess.  Exam WNL.

## 2020-01-20 NOTE — ED CDU PROVIDER INITIAL DAY NOTE - DETAILS
Patient is a 54 y.o female with PMHx of HTN, HLD, asthma who presents to ED c/o  generally not feeling well over past few days along with intermittent elevated BP's at home a/w headache. Pt notes increased thirst, recently started HCTZ.  Pt seen and worked up in ED, ct head normal. Labs +hyponatremia of 129, 128. Pt transferred to CDU for; Fluid restriction, repeat labs, urine osm and electrolytes, vitals q4 and frequent re-evals.

## 2020-01-20 NOTE — ED PROVIDER NOTE - CLINICAL SUMMARY MEDICAL DECISION MAKING FREE TEXT BOX
Pt p/w HA and dizziness, polydipsia, excessive thirst.  Recently changed meds.  HCTZ may worsen thirst, only took for one day yesterday.  Will send labs, provide IVF, CTH, reassess.  Exam WNL.

## 2020-01-21 VITALS
DIASTOLIC BLOOD PRESSURE: 88 MMHG | RESPIRATION RATE: 16 BRPM | TEMPERATURE: 98 F | HEART RATE: 84 BPM | SYSTOLIC BLOOD PRESSURE: 139 MMHG | OXYGEN SATURATION: 100 %

## 2020-01-21 LAB
ANION GAP SERPL CALC-SCNC: 11 MMO/L — SIGNIFICANT CHANGE UP (ref 7–14)
ANION GAP SERPL CALC-SCNC: 11 MMO/L — SIGNIFICANT CHANGE UP (ref 7–14)
BUN SERPL-MCNC: 10 MG/DL — SIGNIFICANT CHANGE UP (ref 7–23)
BUN SERPL-MCNC: 9 MG/DL — SIGNIFICANT CHANGE UP (ref 7–23)
CALCIUM SERPL-MCNC: 9.6 MG/DL — SIGNIFICANT CHANGE UP (ref 8.4–10.5)
CALCIUM SERPL-MCNC: 9.9 MG/DL — SIGNIFICANT CHANGE UP (ref 8.4–10.5)
CHLORIDE SERPL-SCNC: 101 MMOL/L — SIGNIFICANT CHANGE UP (ref 98–107)
CHLORIDE SERPL-SCNC: 102 MMOL/L — SIGNIFICANT CHANGE UP (ref 98–107)
CHOLEST SERPL-MCNC: 333 MG/DL — HIGH (ref 120–199)
CO2 SERPL-SCNC: 24 MMOL/L — SIGNIFICANT CHANGE UP (ref 22–31)
CO2 SERPL-SCNC: 25 MMOL/L — SIGNIFICANT CHANGE UP (ref 22–31)
CORTIS SERPL-MCNC: 17.7 UG/DL — SIGNIFICANT CHANGE UP (ref 2.7–18.4)
CREAT SERPL-MCNC: 0.59 MG/DL — SIGNIFICANT CHANGE UP (ref 0.5–1.3)
CREAT SERPL-MCNC: 0.62 MG/DL — SIGNIFICANT CHANGE UP (ref 0.5–1.3)
GLUCOSE SERPL-MCNC: 100 MG/DL — HIGH (ref 70–99)
GLUCOSE SERPL-MCNC: 96 MG/DL — SIGNIFICANT CHANGE UP (ref 70–99)
HDLC SERPL-MCNC: 50 MG/DL — SIGNIFICANT CHANGE UP (ref 45–65)
LIPID PNL WITH DIRECT LDL SERPL: 250 MG/DL — SIGNIFICANT CHANGE UP
MAGNESIUM SERPL-MCNC: 2.2 MG/DL — SIGNIFICANT CHANGE UP (ref 1.6–2.6)
OSMOLALITY SERPL: 297 MOSMO/KG — HIGH (ref 275–295)
PHOSPHATE SERPL-MCNC: 3.7 MG/DL — SIGNIFICANT CHANGE UP (ref 2.5–4.5)
POTASSIUM SERPL-MCNC: 3.6 MMOL/L — SIGNIFICANT CHANGE UP (ref 3.5–5.3)
POTASSIUM SERPL-MCNC: 4.1 MMOL/L — SIGNIFICANT CHANGE UP (ref 3.5–5.3)
POTASSIUM SERPL-SCNC: 3.6 MMOL/L — SIGNIFICANT CHANGE UP (ref 3.5–5.3)
POTASSIUM SERPL-SCNC: 4.1 MMOL/L — SIGNIFICANT CHANGE UP (ref 3.5–5.3)
SODIUM SERPL-SCNC: 137 MMOL/L — SIGNIFICANT CHANGE UP (ref 135–145)
SODIUM SERPL-SCNC: 137 MMOL/L — SIGNIFICANT CHANGE UP (ref 135–145)
T3 SERPL-MCNC: 110.9 NG/DL — SIGNIFICANT CHANGE UP (ref 80–200)
T4 FREE SERPL-MCNC: 1.52 NG/DL — SIGNIFICANT CHANGE UP (ref 0.9–1.8)
TRIGL SERPL-MCNC: 269 MG/DL — HIGH (ref 10–149)
TSH SERPL-MCNC: 4.84 UIU/ML — HIGH (ref 0.27–4.2)

## 2020-01-21 PROCEDURE — 99217: CPT

## 2020-01-21 RX ORDER — ATORVASTATIN CALCIUM 80 MG/1
80 TABLET, FILM COATED ORAL AT BEDTIME
Refills: 0 | Status: DISCONTINUED | OUTPATIENT
Start: 2020-01-21 | End: 2020-02-03

## 2020-01-21 RX ORDER — ACETAMINOPHEN 500 MG
650 TABLET ORAL ONCE
Refills: 0 | Status: COMPLETED | OUTPATIENT
Start: 2020-01-21 | End: 2020-01-21

## 2020-01-21 RX ADMIN — Medication 650 MILLIGRAM(S): at 11:12

## 2020-01-21 RX ADMIN — Medication 650 MILLIGRAM(S): at 11:42

## 2020-01-21 RX ADMIN — Medication 650 MILLIGRAM(S): at 00:25

## 2020-01-21 NOTE — ED CDU PROVIDER SUBSEQUENT DAY NOTE - PROGRESS NOTE DETAILS
Labs improved since fluid restriction. Appears to be hypervolemia hyponatremia. Lipid profile elevated. Pt notes inconsistently taking cholesterol medication. Pt has no other complaints at this time. Pt pending repeat AM labs. Will monitor closely. Patient signed out to me to f/u. NA improved. Discussed with attending, patient can be discharged home to f/u with PCP. Advised to increased cholesterol med given elevated cholesterol level. Recommends to f/u with PCP for adjustment of BP medication. The patient was given verbal and written discharge instructions. Specifically, instructions when to return to the ED and when to seek follow-up from their pcp was discussed. Any specialty follow-up was discussed, including how to make an appointment.  Instructions were discussed in simple, plain language and was understood by the patient. The patient understands that should their symptoms worsen or any new symptoms arise, they should return to the ED immediately for further evaluation. All pt's questions were answered. Patient verbalizes understanding.

## 2020-01-21 NOTE — ED CDU PROVIDER DISPOSITION NOTE - NSFOLLOWUPINSTRUCTIONS_ED_ALL_ED_FT
Rest. Limited water intake. Recommend increased dosage for Lipitor to 20mg once daily for elevated cholesterol level. Recommends continue with previous blood pressure medication and Follow up with PCP for blood pressure medication management. Advance activity as tolerated. Follow up with your primary care physician and Nephrology in 48-72 hours- bring copies of your results.  Return to the ER for worsening or persistent symptoms, and/or ANY NEW OR CONCERNING SYMPTOMS. If you have issues obtaining follow up, please call: [phone] to obtain a doctor or specialist who takes your insurance in your area. Rest. Limited water intake. Recommend increased dosage for Lipitor to 20mg once daily for elevated cholesterol level. Recommends continue with previous blood pressure medication and Follow up with PCP for blood pressure medication management. Advance activity as tolerated. Follow up with your primary care physician and Nephrology in 48-72 hours- bring copies of your results.  See referral for appointment with Nephrology. Return to the ER for worsening or persistent symptoms, and/or ANY NEW OR CONCERNING SYMPTOMS. If you have issues obtaining follow up, please call: [phone] to obtain a doctor or specialist who takes your insurance in your area.

## 2020-01-21 NOTE — ED CDU PROVIDER DISPOSITION NOTE - ATTENDING CONTRIBUTION TO CARE
I, Jennifer Cabot, MD, have performed a history and physical exam of the patient and discussed their management with the ACP.  I reviewed the PA's note and agree with the documented findings and plan of care. My medical decision making and observations are found above.    Cabot: 54F with fatigue, HTN x days in the setting of her BP medications being changed to valsartan and HCTZ, found to have hyponatremia to 129.  Patient was admitted to the CDU, fluid restricted, Na improved to 137.  Serum osms 297, urine osms 108, trig 269, TFTs normal, serum cortisol normal.  Feels well.  On exam, HDS, BP unconcerning, NAD, MMM, eyes clear, lungs CTAB, heart sounds normal, abd soft, NT, ND, no CVAT, LEs without edema, wwp, skin normal temperature and color, neuro: alert and oriented, no focal deficits.  Will discharge with instructions to limit water intake, increase dose of lipitor, and follow up with PMD and nephrology.

## 2020-01-21 NOTE — ED CDU PROVIDER DISPOSITION NOTE - CLINICAL COURSE
Cabot: 54F with fatigue, HTN x days in the setting of her BP medications being changed to valsartan and HCTZ, found to have hyponatremia to 129.  Patient was admitted to the CDU, fluid restricted, Na improved to 137.  Serum osms 297, urine osms 108, trig 269, TFTs normal, serum cortisol normal.  Feels well.  On exam, HDS, BP unconcerning, NAD, MMM, eyes clear, lungs CTAB, heart sounds normal, abd soft, NT, ND, no CVAT, LEs without edema, wwp, skin normal temperature and color, neuro: alert and oriented, no focal deficits.  Will discharge with instructions to limit water intake, increase dose of lipitor, and follow up with PMD.

## 2020-01-21 NOTE — ED CDU PROVIDER DISPOSITION NOTE - PATIENT PORTAL LINK FT
You can access the FollowMyHealth Patient Portal offered by [facility] by registering at the following website: [email or web address]. By joining [email or web address]’s FollowMyHealth portal, you will also be able to view your health information using other applications (apps) compatible with our system.

## 2020-01-21 NOTE — ED CDU PROVIDER SUBSEQUENT DAY NOTE - ATTENDING CONTRIBUTION TO CARE
I, Jennifer Cabot, MD, have performed a history and physical exam of the patient and discussed their management with the ACP.  I reviewed the PA's note and agree with the documented findings and plan of care. My medical decision making and observations are found above.    Cabot: 54F with fatigue, HTN x days in the setting of her BP medications being changed to valsartan and HCTZ, found to have hyponatremia to 129.  Patient was admitted to the CDU, fluid restricted, Na improved to 137.  Serum osms 297, urine osms 108, trig 269, TFTs normal, pending serum cortisol.  Feels well.  On exam, HDS, BP unconcerning, NAD, MMM, eyes clear, lungs CTAB, heart sounds normal, abd soft, NT, ND, no CVAT, LEs without edema, wwp, skin normal temperature and color, neuro: alert and oriented, no focal deficits.  Will follow up cortisol level.  Anticipate discharge with advice to limit water intake, will increase dose of lipitor, and refer to PMD and nephrology.

## 2020-01-21 NOTE — ED CDU PROVIDER SUBSEQUENT DAY NOTE - MEDICAL DECISION MAKING DETAILS
Cabot: 54F with fatigue, HTN x days in the setting of her BP medications being changed to valsartan and HCTZ, found to have hyponatremia to 129.  Patient was admitted to the CDU, fluid restricted, Na improved to 137.  Serum osms 297, urine osms 108, trig 269, TFTs normal, pending serum cortisol.  Feels well.  On exam, HDS, BP unconcerning, NAD, MMM, eyes clear, lungs CTAB, heart sounds normal, abd soft, NT, ND, no CVAT, LEs without edema, wwp, skin normal temperature and color, neuro: alert and oriented, no focal deficits.  Will follow up cortisol level.  Anticipate discharge with advice to limit water intake, will increase dose of lipitor, and refer to PMD and nephrology.

## 2020-06-13 ENCOUNTER — EMERGENCY (EMERGENCY)
Facility: HOSPITAL | Age: 55
LOS: 1 days | Discharge: HOME | End: 2020-06-13
Attending: STUDENT IN AN ORGANIZED HEALTH CARE EDUCATION/TRAINING PROGRAM
Payer: COMMERCIAL

## 2020-06-13 VITALS
HEART RATE: 105 BPM | DIASTOLIC BLOOD PRESSURE: 89 MMHG | OXYGEN SATURATION: 100 % | TEMPERATURE: 98 F | RESPIRATION RATE: 17 BRPM | SYSTOLIC BLOOD PRESSURE: 187 MMHG

## 2020-06-13 VITALS — DIASTOLIC BLOOD PRESSURE: 81 MMHG | HEART RATE: 84 BPM | SYSTOLIC BLOOD PRESSURE: 151 MMHG

## 2020-06-13 DIAGNOSIS — Z98.890 OTHER SPECIFIED POSTPROCEDURAL STATES: Chronic | ICD-10-CM

## 2020-06-13 DIAGNOSIS — Z98.89 OTHER SPECIFIED POSTPROCEDURAL STATES: Chronic | ICD-10-CM

## 2020-06-13 LAB
ALBUMIN SERPL ELPH-MCNC: 5 G/DL — SIGNIFICANT CHANGE UP (ref 3.3–5)
ALP SERPL-CCNC: 97 U/L — SIGNIFICANT CHANGE UP (ref 40–120)
ALT FLD-CCNC: 19 U/L — SIGNIFICANT CHANGE UP (ref 4–33)
ANION GAP SERPL CALC-SCNC: 16 MMO/L — HIGH (ref 7–14)
AST SERPL-CCNC: 19 U/L — SIGNIFICANT CHANGE UP (ref 4–32)
BILIRUB SERPL-MCNC: 0.4 MG/DL — SIGNIFICANT CHANGE UP (ref 0.2–1.2)
BUN SERPL-MCNC: 9 MG/DL — SIGNIFICANT CHANGE UP (ref 7–23)
CALCIUM SERPL-MCNC: 10.1 MG/DL — SIGNIFICANT CHANGE UP (ref 8.4–10.5)
CHLORIDE SERPL-SCNC: 96 MMOL/L — LOW (ref 98–107)
CO2 SERPL-SCNC: 23 MMOL/L — SIGNIFICANT CHANGE UP (ref 22–31)
CREAT SERPL-MCNC: 0.61 MG/DL — SIGNIFICANT CHANGE UP (ref 0.5–1.3)
GLUCOSE SERPL-MCNC: 101 MG/DL — HIGH (ref 70–99)
HCT VFR BLD CALC: 45.1 % — HIGH (ref 34.5–45)
HGB BLD-MCNC: 14.2 G/DL — SIGNIFICANT CHANGE UP (ref 11.5–15.5)
MCHC RBC-ENTMCNC: 26.4 PG — LOW (ref 27–34)
MCHC RBC-ENTMCNC: 31.5 % — LOW (ref 32–36)
MCV RBC AUTO: 83.8 FL — SIGNIFICANT CHANGE UP (ref 80–100)
NRBC # FLD: 0 K/UL — SIGNIFICANT CHANGE UP (ref 0–0)
PLATELET # BLD AUTO: 386 K/UL — SIGNIFICANT CHANGE UP (ref 150–400)
PMV BLD: 9.2 FL — SIGNIFICANT CHANGE UP (ref 7–13)
POTASSIUM SERPL-MCNC: 4 MMOL/L — SIGNIFICANT CHANGE UP (ref 3.5–5.3)
POTASSIUM SERPL-SCNC: 4 MMOL/L — SIGNIFICANT CHANGE UP (ref 3.5–5.3)
PROT SERPL-MCNC: 8.6 G/DL — HIGH (ref 6–8.3)
RBC # BLD: 5.38 M/UL — HIGH (ref 3.8–5.2)
RBC # FLD: 13 % — SIGNIFICANT CHANGE UP (ref 10.3–14.5)
SODIUM SERPL-SCNC: 135 MMOL/L — SIGNIFICANT CHANGE UP (ref 135–145)
WBC # BLD: 11.57 K/UL — HIGH (ref 3.8–10.5)
WBC # FLD AUTO: 11.57 K/UL — HIGH (ref 3.8–10.5)

## 2020-06-13 PROCEDURE — 99284 EMERGENCY DEPT VISIT MOD MDM: CPT

## 2020-06-13 PROCEDURE — 70450 CT HEAD/BRAIN W/O DYE: CPT | Mod: 26

## 2020-06-13 RX ORDER — SODIUM CHLORIDE 9 MG/ML
1000 INJECTION INTRAMUSCULAR; INTRAVENOUS; SUBCUTANEOUS ONCE
Refills: 0 | Status: COMPLETED | OUTPATIENT
Start: 2020-06-13 | End: 2020-06-13

## 2020-06-13 RX ORDER — METOPROLOL TARTRATE 50 MG
1 TABLET ORAL
Qty: 28 | Refills: 0
Start: 2020-06-13 | End: 2020-06-26

## 2020-06-13 RX ORDER — LABETALOL HCL 100 MG
10 TABLET ORAL ONCE
Refills: 0 | Status: COMPLETED | OUTPATIENT
Start: 2020-06-13 | End: 2020-06-13

## 2020-06-13 RX ORDER — METOCLOPRAMIDE HCL 10 MG
10 TABLET ORAL ONCE
Refills: 0 | Status: COMPLETED | OUTPATIENT
Start: 2020-06-13 | End: 2020-06-13

## 2020-06-13 RX ORDER — KETOROLAC TROMETHAMINE 30 MG/ML
15 SYRINGE (ML) INJECTION ONCE
Refills: 0 | Status: DISCONTINUED | OUTPATIENT
Start: 2020-06-13 | End: 2020-06-13

## 2020-06-13 RX ADMIN — Medication 15 MILLIGRAM(S): at 14:36

## 2020-06-13 RX ADMIN — SODIUM CHLORIDE 1000 MILLILITER(S): 9 INJECTION INTRAMUSCULAR; INTRAVENOUS; SUBCUTANEOUS at 13:59

## 2020-06-13 NOTE — ED PROVIDER NOTE - OBJECTIVE STATEMENT
This is a 55 yr old F, pmh HTN, HLD, fibromyalgia and osteoporosis with c/o blurry vision and headache for the last 3 days. Pt states her cardiologist increased doubled her valsartan (320mg ) in the last 2 days by it's not helping. PT c/o sinus pressure and that is radiation to her left parietal region of her head. Pt endorses took tylenol 1000 mg pta. Denies any fever, chills, sob, dizziness, lightheadedness, numbness, tingling, stiff neck and rigidity.

## 2020-06-13 NOTE — ED ADULT NURSE NOTE - OBJECTIVE STATEMENT
pt coming with HTN already double her dose pt also state pain to left side temporal /eye area pain, denies vision change, no blur vision, no weakness to upper and lower ext. no SOB, fever, no CP. pt took her meds. for BP and Tylenol for HA, prior to arrival, pt stated her HA got better, and will hold for now her pain meds.   pt CT pending dispo.       Batsheva Baker RN

## 2020-06-13 NOTE — ED ADULT NURSE NOTE - NSIMPLEMENTINTERV_GEN_ALL_ED
Implemented All Universal Safety Interventions:  [place] to call system. Call bell, personal items and telephone within reach. Instruct patient to call for assistance. Room bathroom lighting operational. Non-slip footwear when patient is off stretcher. Physically safe environment: no spills, clutter or unnecessary equipment. Stretcher in lowest position, wheels locked, appropriate side rails in place.

## 2020-06-13 NOTE — ED PROVIDER NOTE - ATTENDING CONTRIBUTION TO CARE
This is a 55 yr old F, pmh HTN, HLD, fibromyalgia and osteoporosis with c/o blurry vision and headache for the last 3 days. Pt states her cardiologist increased doubled her valsartan (320mg ) in the last 2 days by it's not helping. PT c/o sinus pressure and that is radiation to her left parietal region of her head. Labs, pain, and bp management,  and ct head imaging study.- WNL, pt start on new bp medication, and follow up with your cardiologist.    MESFIN Collado: I have personally performed a face to face bedside history and physical examination of this patient. I have discussed the history, examination, review of systems, assessment and plan of management with the resident. I have reviewed the electronic medical record and amended it to reflect my history, review of systems, physical exam, assessment and plan.

## 2020-06-13 NOTE — ED PROVIDER NOTE - NSFOLLOWUPINSTRUCTIONS_ED_ALL_ED_FT
Please take your medication as prescribed.    Please follow up with your cardiologist as soon as possible.     Headache    A headache is pain or discomfort felt around the head or neck area. The specific cause of a headache may not be found as there are many types including tension headaches, migraine headaches, and cluster headaches. Watch your condition for any changes. Things you can do to manage your pain include taking over the counter and prescription medications as instructed by your health care provider, lying down in a dark quiet room, limiting stress, getting regular sleep, and refraining from alcohol and tobacco products.    SEEK IMMEDIATE MEDICAL CARE IF YOU HAVE ANY OF THE FOLLOWING SYMPTOMS: fever, vomiting, stiff neck, loss of vision, problems with speech, muscle weakness, loss of balance, trouble walking, passing out, or confusion.

## 2020-06-13 NOTE — ED PROVIDER NOTE - CLINICAL SUMMARY MEDICAL DECISION MAKING FREE TEXT BOX
This is a 55 yr old F, pmh HTN, HLD, fibromyalgia and osteoporosis with c/o blurry vision and headache for the last 3 days. Pt states her cardiologist increased doubled her valsartan (320mg ) in the last 2 days by it's not helping. PT c/o sinus pressure and that is radiation to her left parietal region of her head. Labs, pain, and bp management,  and ct head imaging study. This is a 55 yr old F, pmh HTN, HLD, fibromyalgia and osteoporosis with c/o blurry vision and headache for the last 3 days. Pt states her cardiologist increased doubled her valsartan (320mg ) in the last 2 days by it's not helping. PT c/o sinus pressure and that is radiation to her left parietal region of her head. Labs, pain, and bp management,  and ct head imaging study.- WNL, pt start on new bp medication, and follow up with your cardiologist.

## 2020-08-20 NOTE — ED ADULT TRIAGE NOTE - TEMPERATURE IN FAHRENHEIT (DEGREES F)
Patient Quality Outreach Summary      Summary:    Patient is due/failing the following:   Eye Exam    Type of outreach:    Sent TimeGeniushart message.    Questions for provider review:    None                                                                                                                    LAUREN Saenz       Chart routed to Care Team.    
97.7

## 2021-02-23 ENCOUNTER — EMERGENCY (EMERGENCY)
Facility: HOSPITAL | Age: 56
LOS: 1 days | Discharge: HOME | End: 2021-02-23
Admitting: EMERGENCY MEDICINE
Payer: COMMERCIAL

## 2021-02-23 VITALS
HEART RATE: 98 BPM | DIASTOLIC BLOOD PRESSURE: 89 MMHG | RESPIRATION RATE: 16 BRPM | OXYGEN SATURATION: 100 % | SYSTOLIC BLOOD PRESSURE: 160 MMHG

## 2021-02-23 VITALS
DIASTOLIC BLOOD PRESSURE: 73 MMHG | TEMPERATURE: 98 F | HEIGHT: 60 IN | SYSTOLIC BLOOD PRESSURE: 152 MMHG | OXYGEN SATURATION: 100 % | RESPIRATION RATE: 18 BRPM | HEART RATE: 105 BPM

## 2021-02-23 DIAGNOSIS — Z98.890 OTHER SPECIFIED POSTPROCEDURAL STATES: Chronic | ICD-10-CM

## 2021-02-23 DIAGNOSIS — Z98.89 OTHER SPECIFIED POSTPROCEDURAL STATES: Chronic | ICD-10-CM

## 2021-02-23 LAB
ALBUMIN SERPL ELPH-MCNC: 4.5 G/DL — SIGNIFICANT CHANGE UP (ref 3.3–5)
ALP SERPL-CCNC: 92 U/L — SIGNIFICANT CHANGE UP (ref 40–120)
ALT FLD-CCNC: 21 U/L — SIGNIFICANT CHANGE UP (ref 4–33)
ANION GAP SERPL CALC-SCNC: 10 MMOL/L — SIGNIFICANT CHANGE UP (ref 7–14)
APPEARANCE UR: CLEAR — SIGNIFICANT CHANGE UP
AST SERPL-CCNC: 17 U/L — SIGNIFICANT CHANGE UP (ref 4–32)
BASOPHILS # BLD AUTO: 0.05 K/UL — SIGNIFICANT CHANGE UP (ref 0–0.2)
BASOPHILS NFR BLD AUTO: 0.5 % — SIGNIFICANT CHANGE UP (ref 0–2)
BILIRUB SERPL-MCNC: 0.3 MG/DL — SIGNIFICANT CHANGE UP (ref 0.2–1.2)
BILIRUB UR-MCNC: NEGATIVE — SIGNIFICANT CHANGE UP
BUN SERPL-MCNC: 7 MG/DL — SIGNIFICANT CHANGE UP (ref 7–23)
CALCIUM SERPL-MCNC: 9.7 MG/DL — SIGNIFICANT CHANGE UP (ref 8.4–10.5)
CHLORIDE SERPL-SCNC: 100 MMOL/L — SIGNIFICANT CHANGE UP (ref 98–107)
CO2 SERPL-SCNC: 26 MMOL/L — SIGNIFICANT CHANGE UP (ref 22–31)
COLOR SPEC: COLORLESS — SIGNIFICANT CHANGE UP
CREAT SERPL-MCNC: 0.65 MG/DL — SIGNIFICANT CHANGE UP (ref 0.5–1.3)
DIFF PNL FLD: NEGATIVE — SIGNIFICANT CHANGE UP
EOSINOPHIL # BLD AUTO: 0.09 K/UL — SIGNIFICANT CHANGE UP (ref 0–0.5)
EOSINOPHIL NFR BLD AUTO: 0.9 % — SIGNIFICANT CHANGE UP (ref 0–6)
GLUCOSE SERPL-MCNC: 160 MG/DL — HIGH (ref 70–99)
GLUCOSE UR QL: NEGATIVE — SIGNIFICANT CHANGE UP
HCT VFR BLD CALC: 45.1 % — HIGH (ref 34.5–45)
HGB BLD-MCNC: 13.9 G/DL — SIGNIFICANT CHANGE UP (ref 11.5–15.5)
IANC: 8.46 K/UL — SIGNIFICANT CHANGE UP (ref 1.5–8.5)
IMM GRANULOCYTES NFR BLD AUTO: 0.5 % — SIGNIFICANT CHANGE UP (ref 0–1.5)
KETONES UR-MCNC: NEGATIVE — SIGNIFICANT CHANGE UP
LEUKOCYTE ESTERASE UR-ACNC: NEGATIVE — SIGNIFICANT CHANGE UP
LYMPHOCYTES # BLD AUTO: 1.53 K/UL — SIGNIFICANT CHANGE UP (ref 1–3.3)
LYMPHOCYTES # BLD AUTO: 14.5 % — SIGNIFICANT CHANGE UP (ref 13–44)
MCHC RBC-ENTMCNC: 25.9 PG — LOW (ref 27–34)
MCHC RBC-ENTMCNC: 30.8 GM/DL — LOW (ref 32–36)
MCV RBC AUTO: 84.1 FL — SIGNIFICANT CHANGE UP (ref 80–100)
MONOCYTES # BLD AUTO: 0.4 K/UL — SIGNIFICANT CHANGE UP (ref 0–0.9)
MONOCYTES NFR BLD AUTO: 3.8 % — SIGNIFICANT CHANGE UP (ref 2–14)
NEUTROPHILS # BLD AUTO: 8.46 K/UL — HIGH (ref 1.8–7.4)
NEUTROPHILS NFR BLD AUTO: 79.8 % — HIGH (ref 43–77)
NITRITE UR-MCNC: NEGATIVE — SIGNIFICANT CHANGE UP
NRBC # BLD: 0 /100 WBCS — SIGNIFICANT CHANGE UP
NRBC # FLD: 0 K/UL — SIGNIFICANT CHANGE UP
PH UR: 6.5 — SIGNIFICANT CHANGE UP (ref 5–8)
PLATELET # BLD AUTO: 368 K/UL — SIGNIFICANT CHANGE UP (ref 150–400)
POTASSIUM SERPL-MCNC: 4 MMOL/L — SIGNIFICANT CHANGE UP (ref 3.5–5.3)
POTASSIUM SERPL-SCNC: 4 MMOL/L — SIGNIFICANT CHANGE UP (ref 3.5–5.3)
PROT SERPL-MCNC: 8.1 G/DL — SIGNIFICANT CHANGE UP (ref 6–8.3)
PROT UR-MCNC: NEGATIVE — SIGNIFICANT CHANGE UP
RBC # BLD: 5.36 M/UL — HIGH (ref 3.8–5.2)
RBC # FLD: 13.2 % — SIGNIFICANT CHANGE UP (ref 10.3–14.5)
SARS-COV-2 RNA SPEC QL NAA+PROBE: SIGNIFICANT CHANGE UP
SODIUM SERPL-SCNC: 136 MMOL/L — SIGNIFICANT CHANGE UP (ref 135–145)
SP GR SPEC: 1 — LOW (ref 1.01–1.02)
TROPONIN T, HIGH SENSITIVITY RESULT: <6 NG/L — SIGNIFICANT CHANGE UP
UROBILINOGEN FLD QL: SIGNIFICANT CHANGE UP
WBC # BLD: 10.58 K/UL — HIGH (ref 3.8–10.5)
WBC # FLD AUTO: 10.58 K/UL — HIGH (ref 3.8–10.5)

## 2021-02-23 PROCEDURE — 99285 EMERGENCY DEPT VISIT HI MDM: CPT | Mod: 25

## 2021-02-23 PROCEDURE — 71046 X-RAY EXAM CHEST 2 VIEWS: CPT | Mod: 26

## 2021-02-23 PROCEDURE — 93010 ELECTROCARDIOGRAM REPORT: CPT | Mod: NC

## 2021-02-23 RX ORDER — SODIUM CHLORIDE 9 MG/ML
1000 INJECTION INTRAMUSCULAR; INTRAVENOUS; SUBCUTANEOUS ONCE
Refills: 0 | Status: COMPLETED | OUTPATIENT
Start: 2021-02-23 | End: 2021-02-23

## 2021-02-23 RX ADMIN — SODIUM CHLORIDE 1000 MILLILITER(S): 9 INJECTION INTRAMUSCULAR; INTRAVENOUS; SUBCUTANEOUS at 14:11

## 2021-02-23 NOTE — ED PROVIDER NOTE - OBJECTIVE STATEMENT
54 y/o F pmh HTN, asthma, fibromyalgia p/w generalized headache, fatigue, body aches x 3 days. Pt reports 3 days ago had a mild headache, next day towards the evening pt felt body aches and fatigued. Yesterday pt felt sweaty and had mild chills. States she usually never spikes a fever and just sweats instead. Pt works in a spa and sees multiple clients. Pt also reporting 3 episodes of R sided chest pain lasting for seconds in the last 2 weeks. No chest pain at moment. Denies SOB, palpitations, abd pain, n/v/d.

## 2021-02-23 NOTE — ED PROVIDER NOTE - NSFOLLOWUPINSTRUCTIONS_ED_ALL_ED_FT
SELF-QUARANTINE PERIOD IS RECOMMENDED TO YOU AS PART OF YOUR CARE:    14 day quarantine is recommended from onset of symptoms, and you end quarantine once your are symptom free for 72hrs    Stay inside your home as much as possible, avoiding public places or public interaction.    Do not go to work. If you do enter any public domain, at minimum wear a surgical mask at all times.    Even while indoors, attempt to remain isolated from other individuals such as family or friends, as much as possible.    Return to the emergency room for any symptoms such as worsening shortness of breath, significant worsening cough, high fevers despite antipyretics, or severe weakness/malaise.    Take tylenol 650-1000mg every 6-8hours for body pain or fevers.  You may also take ibuprofen 400mg every 6hrs for body pain or fevers.  Make sure you stay hydrated and get plenty of rest.

## 2021-02-23 NOTE — ED PROVIDER NOTE - PATIENT PORTAL LINK FT
You can access the FollowMyHealth Patient Portal offered by [facility] by registering at the following website: [email or web address]. By joining [employer]’s FollowMyHealth portal, you will also be able to view your health information using other applications (apps) compatible with our system.

## 2021-02-23 NOTE — ED PROVIDER NOTE - PROGRESS NOTE DETAILS
WOODY Eaton: Pt advised to quarantine until she receives results.  The patient was given verbal and written discharge instructions. Specifically, instructions when to return to the ED and when to seek follow-up from their pcp was discussed. Any specialty follow-up was discussed, including how to make an appointment.  Any necessary referral lists provided.  Instructions were discussed in simple, plain language and was understood by the patient. The patient understands that should their symptoms worsen or any new symptoms arise, they should return to the ED immediately for further evaluation. All pt's questions were answered. Patient verbalizes understanding.

## 2021-02-23 NOTE — ED ADULT NURSE NOTE - OBJECTIVE STATEMENT
Lunch cov: pt received in intake 4. states she has been having a headache since last thursday, now has body aches. vs documented. denies sick contacts at home. sl placed labs sent. respirations even and unlabored at this time. NS infusing.

## 2021-02-23 NOTE — ED ADULT TRIAGE NOTE - CHIEF COMPLAINT QUOTE
Pt c/o chills, body aches, generalized headache and sinus pain since Sunday. PMH asthma, HTN, fibromyalgia. Pt states she has been having loose stools. unknown if any sick contact. No complaints of chest pain, nausea, dizziness, vomiting  SOB, fever verbalized..

## 2021-02-23 NOTE — ED PROVIDER NOTE - CLINICAL SUMMARY MEDICAL DECISION MAKING FREE TEXT BOX
56 y/o F pmh HTN, asthma, fibromyalgia p/w generalized headache, fatigue, body aches x 3 days. Pt reports 3 days ago had a mild headache, next day towards the evening pt felt body aches and fatigued. Yesterday pt felt sweaty and had mild chills. States she usually never spikes a fever and just sweats instead. Pt works in a spa and sees multiple clients. Pt also reporting 3 episodes of R sided chest pain lasting for seconds in the last 2 weeks. No chest pain at moment. Denies SOB, palpitations, abd pain, n/v/d.    ekg, labs, cxr, covid swab 54 y/o F pmh HTN, asthma, fibromyalgia p/w generalized headache, fatigue, body aches x 3 days. Pt reports 3 days ago had a mild headache, next day towards the evening pt felt body aches and fatigued. Yesterday pt felt sweaty and had mild chills. States she usually never spikes a fever and just sweats instead. Pt works in a spa and sees multiple clients. Pt also reporting 3 episodes of R sided chest pain lasting for seconds in the last 2 weeks. No chest pain at moment. Denies SOB, palpitations, abd pain, n/v/d.      likely covid related,  pt states she feels dehydrated, requesting ivf   ekg, labs, cxr, covid swab

## 2021-03-12 ENCOUNTER — APPOINTMENT (OUTPATIENT)
Dept: GASTROENTEROLOGY | Facility: CLINIC | Age: 56
End: 2021-03-12

## 2021-05-14 ENCOUNTER — EMERGENCY (EMERGENCY)
Facility: HOSPITAL | Age: 56
LOS: 1 days | Discharge: HOME | End: 2021-05-14
Attending: STUDENT IN AN ORGANIZED HEALTH CARE EDUCATION/TRAINING PROGRAM | Admitting: STUDENT IN AN ORGANIZED HEALTH CARE EDUCATION/TRAINING PROGRAM
Payer: COMMERCIAL

## 2021-05-14 VITALS
DIASTOLIC BLOOD PRESSURE: 90 MMHG | SYSTOLIC BLOOD PRESSURE: 172 MMHG | OXYGEN SATURATION: 100 % | HEART RATE: 106 BPM | HEIGHT: 60 IN | TEMPERATURE: 98 F | RESPIRATION RATE: 18 BRPM

## 2021-05-14 VITALS
HEART RATE: 71 BPM | SYSTOLIC BLOOD PRESSURE: 154 MMHG | TEMPERATURE: 98 F | OXYGEN SATURATION: 100 % | RESPIRATION RATE: 18 BRPM | DIASTOLIC BLOOD PRESSURE: 86 MMHG

## 2021-05-14 DIAGNOSIS — Z98.890 OTHER SPECIFIED POSTPROCEDURAL STATES: Chronic | ICD-10-CM

## 2021-05-14 DIAGNOSIS — Z98.89 OTHER SPECIFIED POSTPROCEDURAL STATES: Chronic | ICD-10-CM

## 2021-05-14 LAB
ALBUMIN SERPL ELPH-MCNC: 4.2 G/DL — SIGNIFICANT CHANGE UP (ref 3.3–5)
ALP SERPL-CCNC: 86 U/L — SIGNIFICANT CHANGE UP (ref 40–120)
ALT FLD-CCNC: 20 U/L — SIGNIFICANT CHANGE UP (ref 4–33)
ANION GAP SERPL CALC-SCNC: 14 MMOL/L — SIGNIFICANT CHANGE UP (ref 7–14)
APPEARANCE UR: CLEAR — SIGNIFICANT CHANGE UP
AST SERPL-CCNC: 17 U/L — SIGNIFICANT CHANGE UP (ref 4–32)
BACTERIA # UR AUTO: NEGATIVE — SIGNIFICANT CHANGE UP
BASOPHILS # BLD AUTO: 0.04 K/UL — SIGNIFICANT CHANGE UP (ref 0–0.2)
BASOPHILS NFR BLD AUTO: 0.3 % — SIGNIFICANT CHANGE UP (ref 0–2)
BILIRUB SERPL-MCNC: 0.4 MG/DL — SIGNIFICANT CHANGE UP (ref 0.2–1.2)
BILIRUB UR-MCNC: NEGATIVE — SIGNIFICANT CHANGE UP
BUN SERPL-MCNC: 9 MG/DL — SIGNIFICANT CHANGE UP (ref 7–23)
CALCIUM SERPL-MCNC: 9 MG/DL — SIGNIFICANT CHANGE UP (ref 8.4–10.5)
CHLORIDE SERPL-SCNC: 96 MMOL/L — LOW (ref 98–107)
CO2 SERPL-SCNC: 23 MMOL/L — SIGNIFICANT CHANGE UP (ref 22–31)
COLOR SPEC: COLORLESS — SIGNIFICANT CHANGE UP
CREAT SERPL-MCNC: 0.62 MG/DL — SIGNIFICANT CHANGE UP (ref 0.5–1.3)
DIFF PNL FLD: ABNORMAL
EOSINOPHIL # BLD AUTO: 0.04 K/UL — SIGNIFICANT CHANGE UP (ref 0–0.5)
EOSINOPHIL NFR BLD AUTO: 0.3 % — SIGNIFICANT CHANGE UP (ref 0–6)
EPI CELLS # UR: 0 /HPF — SIGNIFICANT CHANGE UP (ref 0–5)
GLUCOSE SERPL-MCNC: 102 MG/DL — HIGH (ref 70–99)
GLUCOSE UR QL: NEGATIVE — SIGNIFICANT CHANGE UP
HCT VFR BLD CALC: 42.5 % — SIGNIFICANT CHANGE UP (ref 34.5–45)
HGB BLD-MCNC: 13.4 G/DL — SIGNIFICANT CHANGE UP (ref 11.5–15.5)
IANC: 10.16 K/UL — HIGH (ref 1.5–8.5)
IMM GRANULOCYTES NFR BLD AUTO: 0.5 % — SIGNIFICANT CHANGE UP (ref 0–1.5)
KETONES UR-MCNC: NEGATIVE — SIGNIFICANT CHANGE UP
LEUKOCYTE ESTERASE UR-ACNC: NEGATIVE — SIGNIFICANT CHANGE UP
LYMPHOCYTES # BLD AUTO: 1.73 K/UL — SIGNIFICANT CHANGE UP (ref 1–3.3)
LYMPHOCYTES # BLD AUTO: 13.5 % — SIGNIFICANT CHANGE UP (ref 13–44)
MCHC RBC-ENTMCNC: 26.9 PG — LOW (ref 27–34)
MCHC RBC-ENTMCNC: 31.5 GM/DL — LOW (ref 32–36)
MCV RBC AUTO: 85.3 FL — SIGNIFICANT CHANGE UP (ref 80–100)
MONOCYTES # BLD AUTO: 0.78 K/UL — SIGNIFICANT CHANGE UP (ref 0–0.9)
MONOCYTES NFR BLD AUTO: 6.1 % — SIGNIFICANT CHANGE UP (ref 2–14)
NEUTROPHILS # BLD AUTO: 10.16 K/UL — HIGH (ref 1.8–7.4)
NEUTROPHILS NFR BLD AUTO: 79.3 % — HIGH (ref 43–77)
NITRITE UR-MCNC: NEGATIVE — SIGNIFICANT CHANGE UP
NRBC # BLD: 0 /100 WBCS — SIGNIFICANT CHANGE UP
NRBC # FLD: 0 K/UL — SIGNIFICANT CHANGE UP
PH UR: 6.5 — SIGNIFICANT CHANGE UP (ref 5–8)
PLATELET # BLD AUTO: 357 K/UL — SIGNIFICANT CHANGE UP (ref 150–400)
POTASSIUM SERPL-MCNC: 4.1 MMOL/L — SIGNIFICANT CHANGE UP (ref 3.5–5.3)
POTASSIUM SERPL-SCNC: 4.1 MMOL/L — SIGNIFICANT CHANGE UP (ref 3.5–5.3)
PROT SERPL-MCNC: 7.3 G/DL — SIGNIFICANT CHANGE UP (ref 6–8.3)
PROT UR-MCNC: NEGATIVE — SIGNIFICANT CHANGE UP
RBC # BLD: 4.98 M/UL — SIGNIFICANT CHANGE UP (ref 3.8–5.2)
RBC # FLD: 13.1 % — SIGNIFICANT CHANGE UP (ref 10.3–14.5)
RBC CASTS # UR COMP ASSIST: 0 /HPF — SIGNIFICANT CHANGE UP (ref 0–4)
SODIUM SERPL-SCNC: 133 MMOL/L — LOW (ref 135–145)
SP GR SPEC: 1.01 — LOW (ref 1.01–1.02)
TROPONIN T, HIGH SENSITIVITY RESULT: <6 NG/L — SIGNIFICANT CHANGE UP
TSH SERPL-MCNC: 1.61 UIU/ML — SIGNIFICANT CHANGE UP (ref 0.27–4.2)
UROBILINOGEN FLD QL: SIGNIFICANT CHANGE UP
WBC # BLD: 12.82 K/UL — HIGH (ref 3.8–10.5)
WBC # FLD AUTO: 12.82 K/UL — HIGH (ref 3.8–10.5)
WBC UR QL: 1 /HPF — SIGNIFICANT CHANGE UP (ref 0–5)

## 2021-05-14 PROCEDURE — 71046 X-RAY EXAM CHEST 2 VIEWS: CPT | Mod: 26

## 2021-05-14 PROCEDURE — 99285 EMERGENCY DEPT VISIT HI MDM: CPT | Mod: 25

## 2021-05-14 PROCEDURE — 93010 ELECTROCARDIOGRAM REPORT: CPT | Mod: NC

## 2021-05-14 RX ORDER — SODIUM CHLORIDE 9 MG/ML
1000 INJECTION INTRAMUSCULAR; INTRAVENOUS; SUBCUTANEOUS ONCE
Refills: 0 | Status: COMPLETED | OUTPATIENT
Start: 2021-05-14 | End: 2021-05-14

## 2021-05-14 RX ADMIN — SODIUM CHLORIDE 1000 MILLILITER(S): 9 INJECTION INTRAMUSCULAR; INTRAVENOUS; SUBCUTANEOUS at 19:45

## 2021-05-14 RX ADMIN — SODIUM CHLORIDE 1000 MILLILITER(S): 9 INJECTION INTRAMUSCULAR; INTRAVENOUS; SUBCUTANEOUS at 18:21

## 2021-05-14 NOTE — ED PROVIDER NOTE - CLINICAL SUMMARY MEDICAL DECISION MAKING FREE TEXT BOX
57 y/o female with a hx of HTN, OA, Fibromyalgia, HLD presents to the ER c/o 1 week of generalized fatigue, palpitations, low grade temp 99, palpitations, nausea.  Pt is well appearing, NAD, will check labs, CXR, EKG, UA, IVF, reassess, follow up PMD.

## 2021-05-14 NOTE — ED PROVIDER NOTE - PATIENT PORTAL LINK FT
You can access the FollowMyHealth Patient Portal offered by [facility] by registering at the following website: [email or web address]. By joining [employer]’s FollowMyHealth portal, you will also be able to view your health information using other applications (apps) compatible with our system.

## 2021-05-14 NOTE — ED PROVIDER NOTE - NSFOLLOWUPINSTRUCTIONS_ED_ALL_ED_FT
Follow up with your Doctor in 1-2 days.  Rest.  Drink plenty of fluids.  Return to the ER for any persistent/worsening or new symptoms weakness, dizziness, chest pain, shortness of breath, abdominal pain or any concerning symptoms.

## 2021-05-14 NOTE — ED PROVIDER NOTE - OBJECTIVE STATEMENT
55 y/o female with a hx of HTN, OA, Fibromyalgia, HLD presents to the ER c/o 1 week of generalized fatigue, palpitations, low grade temp 99, palpitations, nausea.  Pt denies abdominal pain, vomiting, back pain, cough, chest pain, shortness of breath, dizziness.  Pt has 2 negative COVID tests.

## 2021-05-14 NOTE — ED PROVIDER NOTE - PROGRESS NOTE DETAILS
WOODY Parks: pt feels better ambulating without difficulty.  Results reviewed with patient.  Discharge reviewed and discussed with patient.

## 2021-05-14 NOTE — ED ADULT TRIAGE NOTE - CHIEF COMPLAINT QUOTE
nausea ,weakness ,fever  x past  wk  with palpitations denies vomiting, appetite unchanged states covid test  both  neg from monday

## 2021-05-14 NOTE — ED PROVIDER NOTE - ATTENDING CONTRIBUTION TO CARE
56F with pmh HTN, HLD, fibromyalgia presenting with 1 week of generalized fatigue, palpitations and endorsement of fevers however stating Tmax 99, mild nausea, loose stools. Denies chills, cp, sob, vomiting, diarrhea, dysuria, headache, numbness    GEN: NAD, awake, well appearing  HEENT: NCAT, MMM, normal conjunctiva, perrl  CHEST/LUNGS: Non-tachypneic, CTAB, bilateral breath sounds  CARDIAC: Non-tachycardic, s1s2, normal perfusion, no peripheral edema  ABDOMEN: Soft, NTND, No rebound/guarding  MSK: No joint tenderness, no gross deformity of extremities  SKIN: No rashes, no petechiae, no vesicles  NEURO: CN grossly intact, normal coordination, no focal motor or sensory deficits  PSYCH: Alert, appropriate, cooperative     Patient presenting with generalize malaise with various symptoms. Exam unremarkable, patient well appearing with benign exam, vitals wnl. Screening labs for anemia, electrolyte imbalance, IV hydration with likely discharge with no acute findings.

## 2021-06-16 ENCOUNTER — RESULT REVIEW (OUTPATIENT)
Age: 56
End: 2021-06-16

## 2021-06-22 ENCOUNTER — APPOINTMENT (OUTPATIENT)
Dept: GASTROENTEROLOGY | Facility: CLINIC | Age: 56
End: 2021-06-22
Payer: COMMERCIAL

## 2021-06-22 VITALS
DIASTOLIC BLOOD PRESSURE: 78 MMHG | WEIGHT: 133 LBS | TEMPERATURE: 98.1 F | SYSTOLIC BLOOD PRESSURE: 128 MMHG | BODY MASS INDEX: 26.11 KG/M2 | HEIGHT: 60 IN

## 2021-06-22 DIAGNOSIS — K57.92 DIVERTICULITIS OF INTESTINE, PART UNSPECIFIED, W/OUT PERFORATION OR ABSCESS W/OUT BLEEDING: ICD-10-CM

## 2021-06-22 DIAGNOSIS — K57.90 DIVERTICULOSIS OF INTESTINE, PART UNSPECIFIED, W/OUT PERFORATION OR ABSCESS W/OUT BLEEDING: ICD-10-CM

## 2021-06-22 DIAGNOSIS — R31.29 OTHER MICROSCOPIC HEMATURIA: ICD-10-CM

## 2021-06-22 PROCEDURE — 99203 OFFICE O/P NEW LOW 30 MIN: CPT

## 2021-06-22 PROCEDURE — 99072 ADDL SUPL MATRL&STAF TM PHE: CPT

## 2021-06-22 RX ORDER — VALSARTAN 160 MG/1
160 TABLET, COATED ORAL
Qty: 135 | Refills: 0 | Status: ACTIVE | COMMUNITY
Start: 2021-06-01

## 2021-06-22 RX ORDER — ROSUVASTATIN CALCIUM 5 MG/1
5 TABLET, FILM COATED ORAL
Qty: 90 | Refills: 0 | Status: ACTIVE | COMMUNITY
Start: 2020-12-15

## 2021-06-22 RX ORDER — AMLODIPINE BESYLATE 2.5 MG/1
2.5 TABLET ORAL
Qty: 30 | Refills: 0 | Status: ACTIVE | COMMUNITY
Start: 2021-05-21

## 2021-06-22 NOTE — ASSESSMENT
[FreeTextEntry1] : 56-year-old female with a reported history of diverticulosis on her last colonoscopy which will be requested with recent low-grade fever, left lower quadrant pain for which she sought evaluation at [facility].  Her white blood cell count was elevated at 12.5 thousand.  A CAT scan was not performed.  She was discharged but the patient took a few days of a Z-Fabrice that she had left and presents for evaluation.  She still has residual discomfort left lower quadrant.  There is no rebound or guarding.  CAT scan of the abdomen pelvis will be obtained at [facility] her preferred imaging center.  We will follow up with her after the CAT scan and likely recommend colonoscopy as her last colonoscopy was more than 5 years and this will be requested as well.

## 2021-06-22 NOTE — CONSULT LETTER
[Dear  ___] : Dear  [unfilled], [Consult Letter:] : I had the pleasure of evaluating your patient, [unfilled]. [Please see my note below.] : Please see my note below. [Consult Closing:] : Thank you very much for allowing me to participate in the care of this patient.  If you have any questions, please do not hesitate to contact me. [Sincerely,] : Sincerely, [FreeTextEntry3] : Rah Dwyer MD, FACP, AGAF, FAASLD\par  of Medicine\par [facility] [employer] [facility]\par

## 2021-06-22 NOTE — HISTORY OF PRESENT ILLNESS
[FreeTextEntry1] : 55 yo female,hx of colonoscopy with Dr. Flannery 2016. Has llq discomfort now. Not vaccinated for covid. Went to [facility] er two weekws ago due to fever, dehydration and was told of elevated wbc. Didn't have a cat scan.No weight loss. Prior to the ER visit was constipated. Went to GYN yesterday for internal sonogram, states told was normal.  Records from [facility] were reviewed which demonstrated a white cell count of 12.5 thousand, a normal EKG and chest x-ray.  A CAT scan the abdomen pelvis was not performed.  She still has some left lower quadrant discomfort.  She states she has a long history of microscopic hematuria on the urinalysis at [facility] showed microscopic hematuria as well.

## 2021-06-22 NOTE — PHYSICAL EXAM
[General Appearance - Alert] : alert [General Appearance - In No Acute Distress] : in no acute distress [Sclera] : the sclera and conjunctiva were normal [PERRL With Normal Accommodation] : pupils were equal in size, round, and reactive to light [Extraocular Movements] : extraocular movements were intact [Outer Ear] : the ears and nose were normal in appearance [Oropharynx] : the oropharynx was normal [Neck Appearance] : the appearance of the neck was normal [Neck Cervical Mass (___cm)] : no neck mass was observed [Jugular Venous Distention Increased] : there was no jugular-venous distention [Thyroid Diffuse Enlargement] : the thyroid was not enlarged [Thyroid Nodule] : there were no palpable thyroid nodules [Auscultation Breath Sounds / Voice Sounds] : lungs were clear to auscultation bilaterally [Heart Rate And Rhythm] : heart rate was normal and rhythm regular [Heart Sounds] : normal S1 and S2 [Heart Sounds Gallop] : no gallops [Murmurs] : no murmurs [Heart Sounds Pericardial Friction Rub] : no pericardial rub [Full Pulse] : the pedal pulses are present [Edema] : there was no peripheral edema [Bowel Sounds] : normal bowel sounds [Abdomen Soft] : soft [] : no hepato-splenomegaly [Abdomen Mass (___ Cm)] : no abdominal mass palpated [FreeTextEntry1] : mild discomfort in llq [Cervical Lymph Nodes Enlarged Posterior Bilaterally] : posterior cervical [Cervical Lymph Nodes Enlarged Anterior Bilaterally] : anterior cervical [No CVA Tenderness] : no ~M costovertebral angle tenderness [Oriented To Time, Place, And Person] : oriented to person, place, and time [Impaired Insight] : insight and judgment were intact

## 2021-07-21 ENCOUNTER — NON-APPOINTMENT (OUTPATIENT)
Age: 56
End: 2021-07-21

## 2021-08-13 ENCOUNTER — EMERGENCY (EMERGENCY)
Facility: HOSPITAL | Age: 56
LOS: 1 days | Discharge: HOME | End: 2021-08-13
Attending: EMERGENCY MEDICINE | Admitting: EMERGENCY MEDICINE
Payer: COMMERCIAL

## 2021-08-13 VITALS
HEIGHT: 60 IN | DIASTOLIC BLOOD PRESSURE: 85 MMHG | HEART RATE: 95 BPM | SYSTOLIC BLOOD PRESSURE: 158 MMHG | TEMPERATURE: 98 F | OXYGEN SATURATION: 100 % | RESPIRATION RATE: 17 BRPM

## 2021-08-13 VITALS
HEART RATE: 82 BPM | DIASTOLIC BLOOD PRESSURE: 70 MMHG | TEMPERATURE: 98 F | OXYGEN SATURATION: 100 % | SYSTOLIC BLOOD PRESSURE: 147 MMHG | RESPIRATION RATE: 16 BRPM

## 2021-08-13 DIAGNOSIS — Z98.890 OTHER SPECIFIED POSTPROCEDURAL STATES: Chronic | ICD-10-CM

## 2021-08-13 DIAGNOSIS — Z98.89 OTHER SPECIFIED POSTPROCEDURAL STATES: Chronic | ICD-10-CM

## 2021-08-13 LAB
ALBUMIN SERPL ELPH-MCNC: 5 G/DL — SIGNIFICANT CHANGE UP (ref 3.3–5)
ALP SERPL-CCNC: 106 U/L — SIGNIFICANT CHANGE UP (ref 40–120)
ALT FLD-CCNC: 22 U/L — SIGNIFICANT CHANGE UP (ref 4–33)
ANION GAP SERPL CALC-SCNC: 15 MMOL/L — HIGH (ref 7–14)
ANION GAP SERPL CALC-SCNC: 16 MMOL/L — HIGH (ref 7–14)
AST SERPL-CCNC: 24 U/L — SIGNIFICANT CHANGE UP (ref 4–32)
BASOPHILS # BLD AUTO: 0.05 K/UL — SIGNIFICANT CHANGE UP (ref 0–0.2)
BASOPHILS NFR BLD AUTO: 0.4 % — SIGNIFICANT CHANGE UP (ref 0–2)
BILIRUB SERPL-MCNC: 0.4 MG/DL — SIGNIFICANT CHANGE UP (ref 0.2–1.2)
BUN SERPL-MCNC: 6 MG/DL — LOW (ref 7–23)
BUN SERPL-MCNC: 6 MG/DL — LOW (ref 7–23)
CALCIUM SERPL-MCNC: 9.3 MG/DL — SIGNIFICANT CHANGE UP (ref 8.4–10.5)
CALCIUM SERPL-MCNC: 9.9 MG/DL — SIGNIFICANT CHANGE UP (ref 8.4–10.5)
CHLORIDE SERPL-SCNC: 90 MMOL/L — LOW (ref 98–107)
CHLORIDE SERPL-SCNC: 96 MMOL/L — LOW (ref 98–107)
CO2 SERPL-SCNC: 19 MMOL/L — LOW (ref 22–31)
CO2 SERPL-SCNC: 21 MMOL/L — LOW (ref 22–31)
CREAT SERPL-MCNC: 0.6 MG/DL — SIGNIFICANT CHANGE UP (ref 0.5–1.3)
CREAT SERPL-MCNC: 0.61 MG/DL — SIGNIFICANT CHANGE UP (ref 0.5–1.3)
EOSINOPHIL # BLD AUTO: 0.06 K/UL — SIGNIFICANT CHANGE UP (ref 0–0.5)
EOSINOPHIL NFR BLD AUTO: 0.5 % — SIGNIFICANT CHANGE UP (ref 0–6)
GLUCOSE SERPL-MCNC: 105 MG/DL — HIGH (ref 70–99)
GLUCOSE SERPL-MCNC: 109 MG/DL — HIGH (ref 70–99)
HCT VFR BLD CALC: 45.6 % — HIGH (ref 34.5–45)
HGB BLD-MCNC: 14.4 G/DL — SIGNIFICANT CHANGE UP (ref 11.5–15.5)
IANC: 9.33 K/UL — HIGH (ref 1.5–8.5)
IMM GRANULOCYTES NFR BLD AUTO: 0.5 % — SIGNIFICANT CHANGE UP (ref 0–1.5)
LYMPHOCYTES # BLD AUTO: 1.32 K/UL — SIGNIFICANT CHANGE UP (ref 1–3.3)
LYMPHOCYTES # BLD AUTO: 11.4 % — LOW (ref 13–44)
MCHC RBC-ENTMCNC: 26.6 PG — LOW (ref 27–34)
MCHC RBC-ENTMCNC: 31.6 GM/DL — LOW (ref 32–36)
MCV RBC AUTO: 84.1 FL — SIGNIFICANT CHANGE UP (ref 80–100)
MONOCYTES # BLD AUTO: 0.78 K/UL — SIGNIFICANT CHANGE UP (ref 0–0.9)
MONOCYTES NFR BLD AUTO: 6.7 % — SIGNIFICANT CHANGE UP (ref 2–14)
NEUTROPHILS # BLD AUTO: 9.33 K/UL — HIGH (ref 1.8–7.4)
NEUTROPHILS NFR BLD AUTO: 80.5 % — HIGH (ref 43–77)
NRBC # BLD: 0 /100 WBCS — SIGNIFICANT CHANGE UP
NRBC # FLD: 0 K/UL — SIGNIFICANT CHANGE UP
PLATELET # BLD AUTO: 360 K/UL — SIGNIFICANT CHANGE UP (ref 150–400)
POTASSIUM SERPL-MCNC: 3.7 MMOL/L — SIGNIFICANT CHANGE UP (ref 3.5–5.3)
POTASSIUM SERPL-MCNC: 3.8 MMOL/L — SIGNIFICANT CHANGE UP (ref 3.5–5.3)
POTASSIUM SERPL-SCNC: 3.7 MMOL/L — SIGNIFICANT CHANGE UP (ref 3.5–5.3)
POTASSIUM SERPL-SCNC: 3.8 MMOL/L — SIGNIFICANT CHANGE UP (ref 3.5–5.3)
PROT SERPL-MCNC: 8.8 G/DL — HIGH (ref 6–8.3)
RBC # BLD: 5.42 M/UL — HIGH (ref 3.8–5.2)
RBC # FLD: 12.6 % — SIGNIFICANT CHANGE UP (ref 10.3–14.5)
SARS-COV-2 RNA SPEC QL NAA+PROBE: SIGNIFICANT CHANGE UP
SODIUM SERPL-SCNC: 127 MMOL/L — LOW (ref 135–145)
SODIUM SERPL-SCNC: 130 MMOL/L — LOW (ref 135–145)
WBC # BLD: 11.6 K/UL — HIGH (ref 3.8–10.5)
WBC # FLD AUTO: 11.6 K/UL — HIGH (ref 3.8–10.5)

## 2021-08-13 PROCEDURE — 99284 EMERGENCY DEPT VISIT MOD MDM: CPT

## 2021-08-13 PROCEDURE — 71045 X-RAY EXAM CHEST 1 VIEW: CPT | Mod: 26

## 2021-08-13 RX ORDER — SODIUM CHLORIDE 9 MG/ML
1000 INJECTION INTRAMUSCULAR; INTRAVENOUS; SUBCUTANEOUS ONCE
Refills: 0 | Status: COMPLETED | OUTPATIENT
Start: 2021-08-13 | End: 2021-08-13

## 2021-08-13 RX ORDER — ACETAMINOPHEN 500 MG
650 TABLET ORAL ONCE
Refills: 0 | Status: COMPLETED | OUTPATIENT
Start: 2021-08-13 | End: 2021-08-13

## 2021-08-13 RX ADMIN — SODIUM CHLORIDE 1000 MILLILITER(S): 9 INJECTION INTRAMUSCULAR; INTRAVENOUS; SUBCUTANEOUS at 15:14

## 2021-08-13 RX ADMIN — Medication 650 MILLIGRAM(S): at 18:23

## 2021-08-13 NOTE — ED PROVIDER NOTE - ATTENDING CONTRIBUTION TO CARE
MD Vaz:  patient seen and evaluated with the resident.  I was present for key portions of the History & Physical, and I agree with the Impression & Plan.  MD Vaz:  57 Yo F, c/o subjective fevers, HA, body aches, and chills.  Duration: acute (2d) on chronic (intermittent > 6wks)  Associated Sx:  intermittent abd cramping, no Cp/SOB, no loose stools, no vomiting, no rash, no LOC, no back pain.  Multiple PMD visits for evaluation of this condition w/o identification of etiology; Patient felt light headed this AM so she came to the ED.   Better/worse: no clear modifiers.  Intensity:  Tm < 99  Location: nonfocal; see HPI  VS: wnl.  Physical Exam: adult M, NAD, NCAT, PERRL, EOMI, neck supple, RRR, CTA B, Abd: s/nd/nt, Ext: no edema, Neuro: AAOx3, ambulates w/o diff, strength 5/5 & symmetric throughout.  Impression:  malaise

## 2021-08-13 NOTE — ED PROVIDER NOTE - CLINICAL SUMMARY MEDICAL DECISION MAKING FREE TEXT BOX
Leonela Coe MD, PGY-2:  57YO female, hx of HTN, HLD, fibromyalgia, p/w low grade fevers, headache, chills, 1 episode of presyncope. denies CP. VS stable, afebrile. suspect covid, low suspicion ACS, arrhythmia, electrolyte abnormalities. plan for syncope/presyncope w/u, iv fluids, reassess.

## 2021-08-13 NOTE — ED PROVIDER NOTE - NSFOLLOWUPINSTRUCTIONS_ED_ALL_ED_FT
For Medical Surgical Hx obtained at Admission, Please see Provider H&P Follow up with primary care doctor in 3 days.     YOU WERE GIVEN COPIES OF ALL LABS AND IMAGING RESULTS FROM YOUR ER VISIT--PLEASE TAKE THEM WITH YOU TO YOUR APPOINTMENT.  3. IF NEEDED, CALL [phone] TO FIND A PRIMARY CARE PHYSICIAN.  OR CALL [phone] TO MAKE AN APPOINTMENT WITH THE MEDICAL CLINIC.  4. RETURN TO THE ER FOR ANY WORSENING SYMPTOMS including severe pain, chest pain, SOB, passing out, fever, chills.

## 2021-08-13 NOTE — ED ADULT TRIAGE NOTE - CHIEF COMPLAINT QUOTE
Pt c/o intermittent fevers, headache, chills x2 weeks. Pt had negative Covid test 3 weeks ago. PMH Asthma

## 2021-08-13 NOTE — ED PROVIDER NOTE - PATIENT PORTAL LINK FT
You can access the FollowMyHealth Patient Portal offered by [facility] by registering at the following website: [email or web address]. By joining [employer]’s FollowMyHealth portal, you will also be able to view your health information using other applications (apps) compatible with our system.

## 2021-08-13 NOTE — ED PROVIDER NOTE - NSICDXPASTMEDICALHX_GEN_ALL_CORE_FT
PAST MEDICAL HISTORY:  Asthma in adult     HLD (hyperlipidemia)     HTN (hypertension)     Neuropathy     Osteoarthritis

## 2021-08-13 NOTE — ED ADULT NURSE NOTE - OBJECTIVE STATEMENT
Pt awake, alert and oriented x 4 c/o fever and chills for a few weeks.   Denies chest pain or SOB.   Denies n/v/d.   Respirations even and unlabored.   Denies pain/burning with urination.   IV placed, blood work sent and fluids started.

## 2021-08-13 NOTE — ED PROVIDER NOTE - OBJECTIVE STATEMENT
57YO female, hx of HTN, HLD, fibromyalgia, p/w low grade fevers, headache, chills x2w. presents today due to presyncope x1 today, denies LOC. problems have been intermittent, had a negative covid test a few weeks prior. denies chest pain, SOB.

## 2021-08-13 NOTE — ED PROVIDER NOTE - NS ED ROS FT
Gen: + fevers, chills  HEENT: + h/a  CV: Denies chest pain  Resp: Denies SOB, cough  GI: Denies nausea, vomiting. + decreased PO intake  Msk: Denies extremity pain  : Denies dysuria  Neuro: + presyncope

## 2021-08-13 NOTE — ED PROVIDER NOTE - TOBACCO USE
DATE OF PROCEDURE:  10/18/2018.    EEG#:  [id number].    REQUESTING PHYSICIAN:  Dr. Corona.    LOCATION OF SERVICE:  402.    ELECTROENCEPHALOGRAM REPORT    METHODOLOGY:  Electroencephalographic (EEG) recording is recorded with   electrodes placed according to the International 10-20 placement system.  Thirty   two (32) channels of digital signal (sampling rate of 512/sec), including T1   and T2, were simultaneously recorded from the scalp and may include EKG, EMG,   and/or eye monitors.  Recording band pass was 0.1 to 512 Hz.  Digital video   recording of the patient is simultaneously recorded with the EEG.  The patient   is instructed to report clinical symptoms which may occur during the recording   session.  EEG and video recording are stored and archived in digital format.    Activation procedures, which include photic stimulation, hyperventilation and   instructing patients to perform simple tasks, are done in selected patients.    The EEG is displayed on a monitor screen and can be reviewed using different   montages.  Computer assisted-analysis is employed to detect spike and   electrographic seizure activity.   The entire record is submitted for computer   analysis.  The entire recording is visually reviewed, and the times identified   by computer analysis as being spikes or seizures are reviewed again.    Compressed spectral analysis (CSA) is also performed on the activity recorded   from each individual channel.  This is displayed as a power display of   frequencies from 0 to 30 Hz over time.   The CSA is reviewed looking for   asymmetries in power between homologous areas of the scalp, then compared with   the original EEG recording.    [employer] software was also utilized in the review of this study.  This software   suite analyzes the EEG recording in multiple domains.  Coherence and rhythmicity   are computed to identify EEG sections which may contain organized seizures.    Each channel undergoes analysis  to detect the presence of spike and sharp waves   which have special and morphological characteristics of epileptic activity.  The   routine EEG recording is converted from special into frequency domain.  This is   then displayed comparing homologous areas to identify areas of significant   asymmetry.  Algorithm to identify non-cortically generated artifact is used to   separate artifact from the EEG.    EEG FINDINGS:  Recording was obtained at the patient's bedside.  It was moving   around spontaneously and was breathing on his own.  Background was a diffuse   mixture of 5 to 6 Hz theta, which was seen diffusely over both hemispheres.    There was mild asymmetry with additional slower theta and some delta noted in   the right hemisphere at times.  No other lateralized or focal findings were   noted.  No spike or sharp wave activity was seen.  The patient was given   instructions and was able to hold up 2 fingers.  The technologist indicated the   patient appeared to be weaker on the left side.  Activation procedures otherwise   were not carried out.    IMPRESSION:  Markedly abnormal EEG with diffuse slowing into the theta range   with some delta noted also on the right.  This indicates presence of very   significant generalized encephalopathy, but the pattern does not indicate a very   specific etiology.  There is also some suggestion of right hemispheric   dysfunction in addition to the encephalopathy.    CLINICAL CORRELATION:  The patient is a 78-year-old male who is being evaluated   for acute encephalopathy.  He has a history of having strokes and that could   explain the asymmetry in the background frequencies.  Clinical findings show he   has gotten some weakness in his left side along with left facial droop, which   does correlate with the greater amount of slowing noted over the right   hemisphere.  There is concern for an epileptic process, but no spike or sharp   wave activity was recorded.      RR/HN   dd: 02/14/2019 10:45:09 (CST)  td: 02/14/2019 12:37:05 (CST)  Doc ID   #[id number]  Job ID #[id number]    CC:    Unknown if ever smoked

## 2021-08-17 ENCOUNTER — APPOINTMENT (OUTPATIENT)
Dept: GASTROENTEROLOGY | Facility: CLINIC | Age: 56
End: 2021-08-17
Payer: COMMERCIAL

## 2021-08-17 VITALS
BODY MASS INDEX: 25.91 KG/M2 | SYSTOLIC BLOOD PRESSURE: 135 MMHG | HEIGHT: 60 IN | WEIGHT: 132 LBS | DIASTOLIC BLOOD PRESSURE: 90 MMHG | TEMPERATURE: 97.8 F

## 2021-08-17 DIAGNOSIS — R50.9 FEVER, UNSPECIFIED: ICD-10-CM

## 2021-08-17 DIAGNOSIS — I10 ESSENTIAL (PRIMARY) HYPERTENSION: ICD-10-CM

## 2021-08-17 DIAGNOSIS — R19.7 DIARRHEA, UNSPECIFIED: ICD-10-CM

## 2021-08-17 DIAGNOSIS — E87.1 HYPO-OSMOLALITY AND HYPONATREMIA: ICD-10-CM

## 2021-08-17 PROCEDURE — 99214 OFFICE O/P EST MOD 30 MIN: CPT

## 2021-08-17 NOTE — ASSESSMENT
[FreeTextEntry1] : 56-year-old female likely previous bacterial infection improved after Z-Fabrice with iatrogenic hyponatremia secondary to or increase ingestion of up to 14 glasses a day of water for up to 1 week.  Her follow-up laboratory tests at near [facility] are within normal limits.  She feels better now except for some constipation.  I advised her to use Colace 100 mg 2-3 times a day.  She will be seen on as needed follow-up I advised her to follow-up with her primary care physician Dr. Emely Anaya

## 2021-08-17 NOTE — CONSULT LETTER
[Dear  ___] : Dear  [unfilled], [Courtesy Letter:] : I had the pleasure of seeing your patient, [unfilled], in my office today. [Please see my note below.] : Please see my note below. [Consult Closing:] : Thank you very much for allowing me to participate in the care of this patient.  If you have any questions, please do not hesitate to contact me. [Sincerely,] : Sincerely, [FreeTextEntry3] : Rah Dwyer MD, FACP, AGAF, FAASLD\par  of Medicine\par [facility] [employer] [facility]\par

## 2021-08-17 NOTE — HISTORY OF PRESENT ILLNESS
[___ Month(s) Ago] : [unfilled] month(s) ago [Ordering Test(s) ___] : ordering [unfilled] [ER Visit] : was seen in the Emergency Department [Heartburn] : denies heartburn [Nausea] : denies nausea [Vomiting] : denies vomiting [Diarrhea] : denies diarrhea [Constipation] : denies constipation [Yellow Skin Or Eyes (Jaundice)] : denies jaundice [Abdominal Pain] : denies abdominal pain [Abdominal Swelling] : denies abdominal swelling [Rectal Pain] : denies rectal pain [GERD] : no gastroesophageal reflux disease [Hiatus Hernia] : no hiatus hernia [Peptic Ulcer Disease] : no peptic ulcer disease [Pancreatitis] : no pancreatitis [Kidney Stone] : no kidney stone [Inflammatory Bowel Disease] : no inflammatory bowel disease [Irritable Bowel Syndrome] : no irritable bowel syndrome [Diverticulitis] : no diverticulitis [Alcohol Abuse] : no alcohol abuse [Malignancy] : no malignancy [Abdominal Surgery] : no abdominal surgery [Appendectomy] : no appendectomy [Cholecystectomy] : no cholecystectomy [_________] : Performed [unfilled] [Fair Compliance] : fair compliance with treatment [Fair Tolerance] : fair tolerance of treatment [Fair Symptom Control] : fair symptom control [de-identified] : 55 yo  female, went to [facility] ER this past Friday night.She has been feeling feverish. Treated by Dr. Anaya with Z-pack  After last visit, she had an [place]pt Cat [place] [facility] [facility] soidum 127 WBC 11.6. Was drinking 14 glasses of water a day for several days. She then went to [facility] ER where her   wbc 8.5 Sodium 139. Feels better now except for constipation. Still not vaccinated for COVID 19. We had requested records from Dr. Flannery previous practice wrt colonoscopy. [de-identified] : two er visits. [de-identified] : neg

## 2021-09-08 NOTE — DISCHARGE NOTE ADULT - MEDICATION SUMMARY - MEDICATIONS TO TAKE
Patient is a 69-year-old female with history of German-en-Y gastric bypass on 8/26/2021. Patient presents today with primary complaint of sudden onset of right-sided chest pain with radiation to her right arm that is worsened with deep inspiration. Patient denies any shortness of breath, fever/chill, cough, abdominal pain, nausea/vomiting, hematochezia, leg swelling, history of thromboembolism, or syncope.            Past Medical History:   Diagnosis Date    Abnormal MRI of head 2010    Nonspecific focus of low attenuation at the right internal capsule on CT and MRI    Anxiety     Chronic low back pain 2014    unknown cause- normal xrays    Depression     Diabetes ([facility] [address]     Pre-DM- no meds    Hypertension     no meds now    Memory loss     Prediabetes     Scoliosis     Stroke ([facility] [address] 2008    denies residual PER PATIENT MINI STROKE( TIA)       Past Surgical History:   Procedure Laterality Date    HX GASTRIC BYPASS           Family History:   Problem Relation Age of Onset    Diabetes Other     Hypertension Other     Heart Disease Other     Asthma Other     Arthritis-osteo Other     Diabetes Mother     Hypertension Mother        Social History     Socioeconomic History    Marital status: SINGLE     Spouse name: Not on file    Number of children: Not on file    Years of education: Not on file    Highest education level: Not on file   Occupational History    Occupation: apartment complex   Tobacco Use    Smoking status: Never Smoker    Smokeless tobacco: Never Used   Vaping Use    Vaping Use: Never used   Substance and Sexual Activity    Alcohol use: Not Currently     Alcohol/week: 1.0 standard drinks     Types: 1 Glasses of wine per week     Comment: socially    Drug use: Never    Sexual activity: Yes     Partners: Male     Birth control/protection: Condom   Other Topics Concern     Service Not Asked    Blood Transfusions Not Asked    Caffeine Concern Not Asked    Occupational Exposure Not Asked   [employer] Hazards Not Asked    Sleep Concern Not Asked    Stress Concern Not Asked    Weight Concern Not Asked    Special Diet Not Asked    Back Care Not Asked    Exercise Not Asked    Bike Helmet Not Asked   [address] Not Asked    Self-Exams Not Asked   Social History Narrative    Not on file     Social Determinants of Health     Financial Resource Strain: Low Risk     Difficulty of Paying Living Expenses: Not very hard   Food Insecurity: No Food Insecurity    Worried About Running Out of Food in the Last Year: Never true    Mynor of Food in the Last Year: Never true   Transportation Needs: No Transportation Needs    Lack of Transportation (Medical): No    Lack of Transportation (Non-Medical): No   Physical Activity:     Days of Exercise per Week:     Minutes of Exercise per Session:    Stress: No Stress Concern Present    Feeling of Stress : Not at all   Social Connections:     Frequency of Communication with Friends and Family:     Frequency of Social Gatherings with Friends and Family:     Attends [employer] Services:     Active Member of Clubs or Organizations:     Attends Club or Organization Meetings:     Marital Status:    Intimate Partner Violence: Unknown    Fear of Current or Ex-Partner: No    Emotionally Abused: No    Physically Abused: Not on file    Sexually Abused: No         ALLERGIES: Patient has no known allergies. Review of Systems   Constitutional: Negative for chills and fever. HENT: Negative for rhinorrhea and sore throat. Eyes: Negative for discharge and redness. Respiratory: Negative for cough and shortness of breath. Cardiovascular: Positive for chest pain. Negative for leg swelling. Gastrointestinal: Negative for abdominal pain, diarrhea, nausea and vomiting. Genitourinary: Negative for difficulty urinating and dysuria. Musculoskeletal: Negative for back pain and neck pain. Skin: Negative for rash and wound. Neurological: Negative for syncope, light-headedness and headaches. Vitals:    09/07/21 1209 09/07/21 1802 09/07/21 1803 09/07/21 1811   BP: 128/74 134/82  134/82   Pulse: 88   88   Resp: 18   18   Temp: 98.7 °F (37.1 °C)   98.5 °F (36.9 °C)   SpO2: 100% (!) 69% 100% 100%   Weight: 131.9 kg (290 lb 12.8 oz)      Height: 5' 5\" (1.651 m)               Physical Exam  Constitutional:       General: She is not in acute distress. Appearance: She is not ill-appearing, toxic-appearing or diaphoretic. HENT:      Head: Normocephalic and atraumatic. Right Ear: External ear normal.      Left Ear: External ear normal.      Nose: No congestion or rhinorrhea. Mouth/Throat:      Mouth: Mucous membranes are moist.      Pharynx: No oropharyngeal exudate or posterior oropharyngeal erythema. Eyes:      General:         Right eye: No discharge. Left eye: No discharge. Pupils: Pupils are equal, round, and reactive to light. Neck:      Vascular: No carotid bruit. Cardiovascular:      Rate and Rhythm: Normal rate and regular rhythm. Heart sounds: No murmur heard. No friction rub. No gallop. Pulmonary:      Effort: Pulmonary effort is normal. No respiratory distress. Breath sounds: No stridor. No wheezing, rhonchi or rales. Abdominal:      General: Abdomen is flat. There is no distension. Tenderness: There is no abdominal tenderness. There is no right CVA tenderness, left CVA tenderness, guarding or rebound. Musculoskeletal:         General: No swelling, tenderness, deformity or signs of injury. Cervical back: No rigidity or tenderness. Right lower leg: No edema. Left lower leg: No edema. Lymphadenopathy:      Cervical: No cervical adenopathy. Skin:     General: Skin is warm. Capillary Refill: Capillary refill takes less than 2 seconds. Coloration: Skin is not jaundiced or pale. Findings: No bruising, erythema, lesion or rash. Neurological:      General: No focal deficit present. Mental Status: She is alert and oriented to person, place, and time. Sensory: No sensory deficit. Motor: No weakness. Psychiatric:         Mood and Affect: Mood normal.          MDM  Number of Diagnoses or Management Options  Pleurisy  Diagnosis management comments: Patient is a 69-year-old female who presents with primary complaint of pleuritic right chest pain, initial chest x-ray demonstrates a focal infiltrate given patient's recent history of gastric bypass I have significant concern for pulmonary embolism. Unfortunately CTA PE protocol was ultimately inadequately timed to evaluate for segmental or subsegmental level pulmonary emboli however did reveal a defect that is consistent with a wedge infarct on the periphery of patient's lungs. After discussion with both the radiologist and an in-depth discussion with the patient decision was made not to proceed with anticoagulation in light of bilateral negative DVT studies and the risks associated with providing long-term anticoagulation to a recently postoperative patient. Patient ultimately discharged in good condition with recommendation for symptomatic control of presumed pleurisy.        Amount and/or Complexity of Data Reviewed  Clinical lab tests: reviewed  Tests in the radiology section of CPT®: reviewed  Tests in the medicine section of CPT®: reviewed           Procedures I will START or STAY ON the medications listed below when I get home from the hospital:    predniSONE 10 mg oral tablet  -- 5 tab(s) oral - by mouth once a day x 3 days  4 tab(s) oral - by mouth once a day x 3 days  3 tab(s) oral - by mouth once a day x 3 days  2 tab(s) oral - by mouth once a day x 3 days  1 tab(s) oral - by mouth once a day x 3 days  -- It is very important that you take or use this exactly as directed.  Do not skip doses or discontinue unless directed by your doctor.  Obtain medical advice before taking any non-prescription drugs as some may affect the action of this medication.  Take with food or milk.    -- Indication: For steroid    Tylenol 500 mg oral tablet  -- 1 tab(s) by mouth , As Needed  -- Indication: For Pain    valsartan 160 mg oral tablet  -- 1.5 tab(s) 240mg by mouth once a day  -- Indication: For blood pressure    benzonatate 100 mg oral capsule  -- 2 cap(s) by mouth 3 times a day  -- Indication: For cough    Symbicort 80 mcg-4.5 mcg/inh inhalation aerosol  -- 2 puff(s) inhaled 2 times a day  -- Indication: For inhaler    ipratropium-albuterol 0.5 mg-2.5 mg/3 mLinhalation solution  -- 3 milliliter(s) inhaled every 6 hours, As Needed   -- Indication: For inhaler    cefuroxime 500 mg oral tablet  -- 1 tab(s) by mouth every 12 hours  -- Indication: For Antibiotic    montelukast 10 mg oral tablet  -- 1 tab(s) by mouth once a day  -- Indication: For Asthma     Flonase 50 mcg/inh nasal spray  -- 1 spray(s) into nose 2 times a day  -- Indication: For Nasal spray    Asmanex  mcg/inh inhalation aerosol  -- 2 puff(s) inhaled 2 times a day  -- Indication: For inhaler    guaifenesin-dextromethorphan 100 mg-10 mg/5 mL oral liquid  -- 10 milliliter(s) by mouth 4 times a day  -- Indication: For cough    Vitamin D3  -- 1 tab(s) by mouth once a day  -- Indication: For supplement

## 2021-10-28 ENCOUNTER — APPOINTMENT (OUTPATIENT)
Dept: RHEUMATOLOGY | Facility: CLINIC | Age: 56
End: 2021-10-28
Payer: COMMERCIAL

## 2021-10-28 VITALS
OXYGEN SATURATION: 98 % | TEMPERATURE: 97 F | HEART RATE: 89 BPM | SYSTOLIC BLOOD PRESSURE: 148 MMHG | BODY MASS INDEX: 26.11 KG/M2 | RESPIRATION RATE: 16 BRPM | WEIGHT: 133 LBS | DIASTOLIC BLOOD PRESSURE: 91 MMHG | HEIGHT: 60 IN

## 2021-10-28 DIAGNOSIS — M79.10 MYALGIA, UNSPECIFIED SITE: ICD-10-CM

## 2021-10-28 DIAGNOSIS — R76.8 OTHER SPECIFIED ABNORMAL IMMUNOLOGICAL FINDINGS IN SERUM: ICD-10-CM

## 2021-10-28 DIAGNOSIS — R41.89 OTHER SYMPTOMS AND SIGNS INVOLVING COGNITIVE FUNCTIONS AND AWARENESS: ICD-10-CM

## 2021-10-28 DIAGNOSIS — M62.838 OTHER MUSCLE SPASM: ICD-10-CM

## 2021-10-28 PROCEDURE — 99204 OFFICE O/P NEW MOD 45 MIN: CPT

## 2021-10-29 PROBLEM — M79.10 MYALGIA: Status: ACTIVE | Noted: 2021-10-29

## 2021-10-29 PROBLEM — R41.89 BRAIN FOG: Status: ACTIVE | Noted: 2021-10-29

## 2021-10-29 PROBLEM — M62.838 MUSCLE SPASM: Status: ACTIVE | Noted: 2021-10-29

## 2021-10-29 LAB
25(OH)D3 SERPL-MCNC: 22.3 NG/ML
ALBUMIN SERPL ELPH-MCNC: 4.6 G/DL
ALP BLD-CCNC: 112 U/L
ALT SERPL-CCNC: 34 U/L
ANION GAP SERPL CALC-SCNC: 18 MMOL/L
APPEARANCE: CLEAR
AST SERPL-CCNC: 26 U/L
BACTERIA: NEGATIVE
BASOPHILS # BLD AUTO: 0.06 K/UL
BASOPHILS NFR BLD AUTO: 0.7 %
BILIRUB SERPL-MCNC: 0.2 MG/DL
BILIRUBIN URINE: NEGATIVE
BLOOD URINE: NEGATIVE
BUN SERPL-MCNC: 11 MG/DL
C3 SERPL-MCNC: 123 MG/DL
C4 SERPL-MCNC: 31 MG/DL
CALCIUM SERPL-MCNC: 10.1 MG/DL
CHLORIDE SERPL-SCNC: 101 MMOL/L
CK SERPL-CCNC: 158 U/L
CO2 SERPL-SCNC: 20 MMOL/L
COLOR: COLORLESS
CONFIRM: 28.7 SEC
CREAT SERPL-MCNC: 0.68 MG/DL
CREAT SPEC-SCNC: 11 MG/DL
CREAT/PROT UR: NORMAL RATIO
DEPRECATED KAPPA LC FREE/LAMBDA SER: 0.95 RATIO
DRVVT IMM 1:2 NP PPP: NORMAL
DRVVT SCREEN TO CONFIRM RATIO: 0.91 RATIO
EOSINOPHIL # BLD AUTO: 0.38 K/UL
EOSINOPHIL NFR BLD AUTO: 4.2 %
GLUCOSE QUALITATIVE U: NEGATIVE
HCT VFR BLD CALC: 48.7 %
HGB BLD-MCNC: 14.6 G/DL
HYALINE CASTS: 0 /LPF
IGA SER QL IEP: 522 MG/DL
IGG SER QL IEP: 1478 MG/DL
IGM SER QL IEP: 144 MG/DL
IMM GRANULOCYTES NFR BLD AUTO: 0.8 %
KAPPA LC CSF-MCNC: 1.45 MG/DL
KAPPA LC SERPL-MCNC: 1.38 MG/DL
KETONES URINE: NEGATIVE
LEUKOCYTE ESTERASE URINE: NEGATIVE
LYMPHOCYTES # BLD AUTO: 1.75 K/UL
LYMPHOCYTES NFR BLD AUTO: 19.4 %
MAN DIFF?: NORMAL
MCHC RBC-ENTMCNC: 27.1 PG
MCHC RBC-ENTMCNC: 30 GM/DL
MCV RBC AUTO: 90.4 FL
MICROSCOPIC-UA: NORMAL
MONOCYTES # BLD AUTO: 0.91 K/UL
MONOCYTES NFR BLD AUTO: 10.1 %
MYOGLOBIN SERPL-MCNC: 29 NG/ML
NEUTROPHILS # BLD AUTO: 5.83 K/UL
NEUTROPHILS NFR BLD AUTO: 64.8 %
NITRITE URINE: NEGATIVE
PH URINE: 6
PLATELET # BLD AUTO: 392 K/UL
POTASSIUM SERPL-SCNC: 5.4 MMOL/L
PROT SERPL-MCNC: 7.9 G/DL
PROT UR-MCNC: <4 MG/DL
PROTEIN URINE: NEGATIVE
RBC # BLD: 5.39 M/UL
RBC # FLD: 13.6 %
RED BLOOD CELLS URINE: 0 /HPF
RHEUMATOID FACT SER QL: <10 IU/ML
SCREEN DRVVT: 34.8 SEC
SILICA CLOTTING TIME INTERPRETATION: NORMAL
SILICA CLOTTING TIME S/C: 0.93 RATIO
SODIUM SERPL-SCNC: 139 MMOL/L
SPECIFIC GRAVITY URINE: 1
SQUAMOUS EPITHELIAL CELLS: 0 /HPF
THYROGLOB AB SERPL-ACNC: <20 IU/ML
THYROPEROXIDASE AB SERPL IA-ACNC: 33.3 IU/ML
TSH SERPL-ACNC: 1.9 UIU/ML
UROBILINOGEN URINE: NORMAL
WBC # FLD AUTO: 9 K/UL
WHITE BLOOD CELLS URINE: 0 /HPF

## 2021-10-30 LAB
CENTROMERE IGG SER-ACNC: <0.2 CD:130001892
ENA RNP AB SER IA-ACNC: 1.2 AL
ENA SCL70 IGG SER IA-ACNC: <0.2 AL
ENA SM AB SER IA-ACNC: <0.2 AL
ENA SS-A AB SER IA-ACNC: <0.2 AL
ENA SS-B AB SER IA-ACNC: <0.2 AL

## 2021-10-31 LAB
B2 GLYCOPROT1 IGA SERPL IA-ACNC: 7.2 SAU
B2 GLYCOPROT1 IGG SER-ACNC: <5 SGU
B2 GLYCOPROT1 IGM SER-ACNC: 5.3 SMU
CARDIOLIPIN IGM SER-MCNC: <5 GPL
DEPRECATED CARDIOLIPIN IGA SER: 6.9 APL

## 2021-11-01 LAB
ANA SER IF-ACNC: NEGATIVE
CCP AB SER IA-ACNC: <8 UNITS
DSDNA AB SER-ACNC: <12 IU/ML
RF+CCP IGG SER-IMP: NEGATIVE

## 2021-11-02 LAB
MYELOPEROXIDASE AB SER QL IA: <5 UNITS
MYELOPEROXIDASE CELLS FLD QL: NEGATIVE
PROTEINASE3 AB SER IA-ACNC: 6.5 UNITS
PROTEINASE3 AB SER-ACNC: NEGATIVE
RNA POLYMERASE III IGG: 8 UNITS

## 2021-11-03 LAB — CARDIOLIPIN IGM SER-MCNC: 13.3 MPL

## 2022-01-03 ENCOUNTER — NON-APPOINTMENT (OUTPATIENT)
Age: 57
End: 2022-01-03

## 2022-05-04 ENCOUNTER — APPOINTMENT (OUTPATIENT)
Dept: GASTROENTEROLOGY | Facility: CLINIC | Age: 57
End: 2022-05-04

## 2022-06-03 NOTE — ED PROVIDER NOTE - NSCAREINITIATED _GEN_ER
Lois Casas  1947    Specialist or PCP: Dea Almaraz NP  Symptoms: upper stomach area Pain when she eats, bloating, neck pain, swollen glands  Call Back #: [phone] (home)  Transferred call to:  Message to triage     Leonela Coe(Resident)

## 2022-10-20 NOTE — DISCHARGE NOTE ADULT - MEDICATION SUMMARY - MEDICATIONS TO STOP TAKING
I will STOP taking the medications listed below when I get home from the hospital:  None Ear Wedge Repair Text: A wedge excision was completed by carrying down an excision through the full thickness of the ear and cartilage with an inward facing Burow's triangle. The wound was then closed in a layered fashion.

## 2022-12-10 NOTE — ED CDU PROVIDER SUBSEQUENT DAY NOTE - HISTORY
Goal Outcome Evaluation:      Plan of Care Reviewed With: patient, caregiver    Overall Patient Progress: improving     Patient vital signs are at baseline: Yes  Patient able to ambulate as they were prior to admission or with assist devices provided by therapies during their stay:  No,  Reason:  Assist of 2 w/sera steady  Patient MUST void prior to discharge:  No,  Reason:  Tay placed for retention on previous shift.  Patient able to tolerate oral intake:  Yes  Pain has adequate pain control using Oral analgesics:  Yes  Does patient have an identified :  No,  Reason:  Pt is pending placement   Has goal D/C date and time been discussed with patient:  No,  Reason:  Pending    A & O x1, self only. Tolerating regular diet. Up with 2 assist/sera steady. Right shoulder bruised/painful. PIV saline locked. One BM this shift. Tay placed dayshift for retention                    53 yo F with PMHx of HTN, HLD, asthma p/w generally not feeling well over past few days along with intermittent elevated BP's at home a/w headache. Pt notes increased thirst, recently started HCTZ. In ED, ct head normal. Labs +hyponatremia of 129, 128. Pt sent to CDU for labs, observation.

## 2023-09-19 NOTE — ED ADULT NURSE NOTE - CHIEF COMPLAINT QUOTE
I have discussed and reviewed this pts case with outgoing provider, Dr. Murry. The assessment and plan has been discussed.  The current assessment, history and observation period has been explained to me.    At time of sign out, pending evaluation. Pt presents to the ED after reportedly drinking tonight after prolonged period of sobriety. Does have Hx of alcohol abuse. Dropped off by her  who left. Pt herself does not want inpatient treatment, would be agreeable to receiving outpatient resources.  reports Hx of alcohol withdrawal seizures in the past but not for several years. No SI or HI    ED Course as of 09/18/23 2325   Mon Sep 18, 2023   2127 Blood work reviewed, notable for elevated alcohol level at 2:58 a.m., otherwise reassuring. [AR]   2248 Patient re-evaluated.  She does not want inpatient management.  She reports today was the 1st time she is drank alcohol in over 1 year.  She denies any suicidal or homicidal ideation.  Does want to get sober but would like to utilize outpatient resources as that has been what has been most effective in the past.  She was able to convince her  come pick her up. [AR]   2324 Patient's  was able to come pick her up.  He is upset about her relapse.  She continues to decline inpatient management and  is understanding that she can not be forced to.  He is willing to give her a safe ride home.  They plan to stay in a hotel tonight until  is able to evaluate the house for source of alcohol.  Discussed return precautions and questions answered.  They demonstrated verbal understanding and agreement with plan [AR]      ED Course User Index  [AR] Bennett Vega DO       Clinical Impression and Diagnosis  11:25 PM       ED Diagnosis     Diagnosis Comment Associated Orders       Final diagnosis    Alcoholic intoxication without complication (CMD) -- --          Follow Up:  Bhavana Yoo DO  [address]  SOB, wheezing for 1 week  pt on prednisone for 6 days, home nebulizer and singulair- pt states that she is still having break though wheezing [id number]  [phone]    Schedule an appointment as soon as possible for a visit       [facility]  [address]   [place] [address] [phone]  Go to   If symptoms worsen          Summary of your Discharge Medications      You have not been prescribed any medications.         Pt is discharged to home/self care in stable condition.        Chief Complaint   Patient presents with   • Alcohol Problem       Results for orders placed or performed during the hospital encounter of 09/18/23   Comprehensive Metabolic Panel   Result Value    Fasting Status     Sodium 145    Potassium 3.5    Chloride 107    Carbon Dioxide 26    Anion Gap 16    Glucose 90    BUN 15    Creatinine 0.61    Glomerular Filtration Rate >90     Comment: eGFR results = or >60 mL/min/1.73m2 = Normal kidney function. Estimated GFR calculated using the CKD-EPI-R (2021) equation that does not include race in the creatinine calculation.    BUN/Cr 25    Calcium 9.1    Bilirubin, Total 0.2    GOT/AST 33    GPT/ALT 36    Alkaline Phosphatase 88    Albumin 3.9    Protein, Total 7.8    Globulin 3.9    A/G Ratio 1.0   Magnesium   Result Value    Magnesium 1.8   Thyroid Stimulating Hormone Reflex   Result Value    TSH 2.198     Comment: Findings most consistent with euthyroid state, no additional testing suggested. TSH may be normal in patients with thyroid dysfunction and pituitary disease. Clinical correlation recommended.    (Reflex TSH algorithm is not recommended in hospitalized patients. A variety of drugs, as well as serious acute and chronic illnesses may alter thyroid function tests. Commonly implicated drugs include glucocorticoids, dopamine, carbamazepine, iodine, amiodarone, lithium and heparin.)   Acetaminophen Level   Result Value    Acetaminophen <2 (L)   Salicylate Level   Result Value    Salicylate <2.8   Drug Abuse Screen, Urine   Result Value    Amphetamines, Urine Negative     Comment: Cutoff = 500 ng/mL     Barbiturates, Urine Negative     Comment: Cutoff = 200 ng/mL    Benzodiazepines, Urine Negative     Comment: Cutoff = 200 ng/mL    Cocaine/ Metabolite, Urine Negative     Comment: Cutoff = 150 ng/ml    Opiates, Urine Negative     Comment: Cutoff = 300 ng/mL    Phencyclidine, Urine Negative     Comment: Cutoff = 25 ng/mL    Cannabinoids, Urine Negative     Comment: Cutoff = 50 ng/mL    Fentanyl, Urine Screen Negative     Comment: Screen Cutoff Concentration: 1.0 ng/mL   Alcohol   Result Value    Alcohol 258 (H)   CBC with Automated Differential (performable only)   Result Value    WBC 8.1    RBC 4.48    HGB 14.1    HCT 40.5    MCV 90.4    MCH 31.5    MCHC 34.8    RDW-CV 11.9    RDW-SD 39.3        NRBC 0    Neutrophil, Percent 58    Lymphocytes, Percent 34    Mono, Percent 5    Eosinophils, Percent 2    Basophils, Percent 1    Immature Granulocytes 0    Absolute Neutrophils 4.8    Absolute Lymphocytes 2.8    Absolute Monocytes 0.4    Absolute Eosinophils  0.2    Absolute Basophils 0.0    Absolute Immature Granulocytes 0.0   Electrocardiogram 12-Lead   Result Value    Systolic Blood Pressure 122    Diastolic Blood Pressure 73    Ventricular Rate EKG/Min (BPM) 81    Atrial Rate (BPM) 81    WY-Interval (MSEC) 172    QRS-Interval (MSEC) 80    QT-Interval (MSEC) 376    QTc 436    P Axis (Degrees) 83    R Axis (Degrees) 73    T Axis (Degrees) 69    REPORT TEXT      Normal sinus rhythm  Normal ECG  No previous ECGs available  Confirmed by AQUILINO PONCE MD ([id number]),  Elaine Mcmahon ([id number]) on 9/18/2023 8:43:17 PM         Vitals:    09/18/23 2102 09/18/23 2132 09/18/23 2201 09/18/23 2234   BP: 102/71 103/66 121/74 106/55   BP Location:       Patient Position:       Pulse: 73 (!) 106 70 84   Resp: 17  16 16   Temp:       TempSrc:       SpO2: 96% 98% 98% 96%   Weight:       Height:             GaryBennettDO  09/18/23 7749

## 2025-05-02 NOTE — PROGRESS NOTE ADULT - ASSESSMENT
..Diagnosis:   1. 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     Carmen is the ordering clinician, therapy plan orders reviewed and signed by clinician.     Vital Signs: See VS flowsheet for details     Allergies:  ALLERGIES:  No Known Allergies      Labs reviewed with the patient: Yes    Nursing Summary:    Pt here for IVH. States feeling well overall. PAC accessed. Pt tolerated the infusion.   A&Ox4, ambulatory and in stable condition upon discharge. No questions at time and aware of upcoming appointments.     Patient condition stable during treatment? - Yes  Questions were answered and understanding was verbalized. - Yes  Education provided No    Patient advised on follow up, any and all questions answered.    F/u scheduled for:   5/6 PA visit     
52 y/o F hx of HTN, HLD, asthma presents with wheezing today. Pt recently was in ED on June 13, and June 09, respectively for asthma exacerbation. Pt was given duoenbs, and sent on PO steroids. Pt has been taking prednisone 60 mg and using albuterol nebulizer every 4 hours. Pt however has had wheezing. Pt also completed a 5 day course of azithromycin today. Pt went to PCP today, and had a full blown asthma attack. Pt was given duoneb treatment and sent to the emergency room. Pt had a cold last week. Denies fevers, chills, CP, abdominal pain, n/v/d. Endorses dry cough.    -Parainfluenza induced asthma exacerbation    sstable oxygenation on room air  change to prednisone 50 mg at time of dc qd and decrease by 10 mg every 3 days- hopeful today  albuterol/atrovent nebs q4h holding 2am dose- would dc on same with instructions to pt to decrease frequency as she feels better  pulmicort 0.5mg nebs q12h - d/w patient who is willing to take the inhaled steroid as long as it does not affect her BP  robitussin DM/tessalon/bedtime hycodan  singulair    thrush  - nystatin swish and swallow    insomnia  - melatonin  qhs    htn  - cw home meds    d/c home today  follow up with pulmonary
52 y/o F hx of HTN, HLD, asthma presents with wheezing today. Pt recently was in ED on June 13, and June 09, respectively for asthma exacerbation. Pt was given duoenbs, and sent on PO steroids. Pt has been taking prednisone 60 mg and using albuterol nebulizer every 4 hours. Pt however has had wheezing. Pt also completed a 5 day course of azithromycin today. Pt went to PCP today, and had a full blown asthma attack. Pt was given duoneb treatment and sent to the emergency room. Pt had a cold last week. Denies fevers, chills, CP, abdominal pain, n/v/d. Endorses dry cough.    -Parainfluenza induced asthma exacerbation    stable oxygenation on room air  continue solumedrol 20mg IVP q8h - hopeful transition to oral steroids tomorrow  albuterol/atrovent nebs q4h holding 2am dose  pulmicort 0.5mg nebs q12h - d/w patient who is willing to take the inhaled steroid as long as it does not affect her BP  robitussin DM/tessalon/bedtime hycodan  singulair  nystatin S & S  diovan  DVT prophylaxis  chest CT scan noted    thrush  - nystatin swish and swallow    insomnia  - melatonin  qhs    htn  - cw home meds
54 y/o F hx of HTN, HLD, asthma presents with wheezing today. Pt recently was in ED on June 13, and June 09, respectively for asthma exacerbation. Pt was given duoenbs, and sent on PO steroids. Pt has been taking prednisone 60 mg and using albuterol nebulizer every 4 hours. Pt however has had wheezing. Pt also completed a 5 day course of azithromycin today. Pt went to PCP today, and had a full blown asthma attack. Pt was given duoneb treatment and sent to the emergency room. Pt had a cold last week. Denies fevers, chills, CP, abdominal pain, n/v/d. Endorses dry cough.    -Parainfluenza induced asthma exacerbation    continue bronchodilators qid  continue budesonide bid  decrease solumedrol to 20 q6  pulmonary hygiene  Robitussin prn  Oxygen saturation greater than 92%  pulm follow up     thrush  - nystatin swish and swallow    insomnia  - melatonin  qhs    htn  - cw home meds
54 y/o F hx of HTN, HLD, asthma presents with wheezing today. Pt recently was in ED on June 13, and June 09, respectively for asthma exacerbation. Pt was given duoenbs, and sent on PO steroids. Pt has been taking prednisone 60 mg and using albuterol nebulizer every 4 hours. Pt however has had wheezing. Pt also completed a 5 day course of azithromycin today. Pt went to PCP today, and had a full blown asthma attack. Pt was given duoneb treatment and sent to the emergency room. Pt had a cold last week. Denies fevers, chills, CP, abdominal pain, n/v/d. Endorses dry cough.    -Parainfluenza induced asthma exacerbation    continue bronchodilators qid  continue budesonide bid  decrease solumedrol to 20 q6  pulmonary hygiene  dvt prophylaxis  routine gi prophylaxis as necessary  Oxygen saturation greater than 92%    htn  - cw home meds
54 y/o F hx of HTN, HLD, asthma presents with wheezing today. Pt recently was in ED on June 13, and June 09, respectively for asthma exacerbation. Pt was given duoenbs, and sent on PO steroids. Pt has been taking prednisone 60 mg and using albuterol nebulizer every 4 hours. Pt however has had wheezing. Pt also completed a 5 day course of azithromycin today. Pt went to PCP today, and had a full blown asthma attack. Pt was given duoneb treatment and sent to the emergency room. Pt had a cold last week. Denies fevers, chills, CP, abdominal pain, n/v/d. Endorses dry cough.    parainfluenza induced asthma exacerbation  - solumedrol 40 iv q 8  - duonebs q 6 atc  - Pulmicort bid  - cw singulair  - add mucinex    htn  - cw home meds
ASSESSMENT:    parainfluenza viral induced severe asthma exacerbation - steroid use has been complicated by thrush, hypertension and anxiety    PLAN/RECOMMENDATIONS:    stable oxygenation on room air  continue solumedrol 20mg IVP q8h  albuterol/atrovent nebs q4h holding 2am dose  pulmicort 0.5mg nebs q12h  robitussin DM/tessalon/bedtime hycodan  singulair  nystatin S & S  diovan  DVT prophylaxis    Will follow with you. Please call with questions    Jason Bagley MD, [facility] - [phone]  Pulmonary Medicine
ASSESSMENT:    parainfluenza viral induced severe asthma exacerbation -slow improvement - steroid use has been complicated by thrush, hypertension and anxiety     PLAN/RECOMMENDATIONS:    stable oxygenation on room air  change to prednisone 50 mg at time of dc qd and decrease by 10 mg every 3 days- hopeful today  albuterol/atrovent nebs q4h holding 2am dose- would dc on same with instructions to pt to decrease frequency as she feels better  pulmicort 0.5mg nebs q12h - d/w patient who is willing to take the inhaled steroid as long as it does not affect her BP  robitussin DM/tessalon/bedtime hycodan  singulair  nystatin S & S  diovan  DVT prophylaxis  chest CT scan without pneumonia- would stop ceftin    pt has nebulizer at home.  needs meds.  I have discussed dc planning and what to anticipate with recovery at length.  ok for dc from pulmonary standpoint with f/u with me 7-10 days    Merry Dunbar MD, [facility] - [phone]  Pulmonary Medicine
ASSESSMENT:    parainfluenza viral induced severe asthma exacerbation -slow improvement - steroid use has been complicated by thrush, hypertension and anxiety     PLAN/RECOMMENDATIONS:    stable oxygenation on room air  continue solumedrol 20mg IVP q8h - hopeful transition to oral steroids tomorrow  albuterol/atrovent nebs q4h holding 2am dose  pulmicort 0.5mg nebs q12h - d/w patient who is willing to take the inhaled steroid as long as it does not affect her BP  robitussin DM/tessalon/bedtime hycodan  singulair  nystatin S & S  diovan  DVT prophylaxis  chest CT scan ordered    Will follow with you. Please call with questions. Plan of care discussed with the patient at length at bedside and the dedicated floor NP. She will need a nebulizer for discharge as she is not comfortable using inhalers    Jason Bagley MD, [facility] - [phone]  Pulmonary Medicine
CHIEF COMPLAINT: 53 yr old woman, history of asthma, sent to the hospital from PMD office with asthma exacerbation.  The patient has had increased cough, sob for approximately one week. She was seen in the ED twice and discharged with prednisone, bronchodilators and azithromycin.  While at her PMD office yesterday, she developed acute onset of wheeze, sob, inability to breathe.  She feel better at present , but still has chest tightness and cough;---Parainfluenza induced asthma exacerbation    continue bronchodilators qid  continue budesonide bid  decrease solumedrol to 20 q6  pulmonary hygiene  dvt prophylaxis  routine gi prophylaxis as necessary  Oxygen saturation greater than 92%    I have explained the situation at great length to the patient    Merry Dunbar MD  [phone]  Pulmonary